# Patient Record
Sex: MALE | Race: WHITE | NOT HISPANIC OR LATINO | URBAN - METROPOLITAN AREA
[De-identification: names, ages, dates, MRNs, and addresses within clinical notes are randomized per-mention and may not be internally consistent; named-entity substitution may affect disease eponyms.]

---

## 2018-09-07 ENCOUNTER — OFFICE VISIT - HEALTHEAST (OUTPATIENT)
Dept: FAMILY MEDICINE | Facility: CLINIC | Age: 38
End: 2018-09-07

## 2018-10-26 ENCOUNTER — OFFICE VISIT - HEALTHEAST (OUTPATIENT)
Dept: FAMILY MEDICINE | Facility: CLINIC | Age: 38
End: 2018-10-26

## 2018-10-26 DIAGNOSIS — R26.89 BALANCE PROBLEM: ICD-10-CM

## 2018-10-26 DIAGNOSIS — Z23 NEED FOR VACCINATION: ICD-10-CM

## 2018-10-26 DIAGNOSIS — I10 ESSENTIAL HYPERTENSION, BENIGN: ICD-10-CM

## 2018-10-26 DIAGNOSIS — F43.22 ADJUSTMENT DISORDER WITH ANXIOUS MOOD: ICD-10-CM

## 2018-10-26 RX ORDER — HYDROXYZINE HYDROCHLORIDE 50 MG/1
50 TABLET, FILM COATED ORAL 3 TIMES DAILY PRN
Qty: 60 TABLET | Refills: 1 | Status: SHIPPED | OUTPATIENT
Start: 2018-10-26 | End: 2021-08-26

## 2018-11-21 ENCOUNTER — OFFICE VISIT - HEALTHEAST (OUTPATIENT)
Dept: FAMILY MEDICINE | Facility: CLINIC | Age: 38
End: 2018-11-21

## 2018-11-21 DIAGNOSIS — F43.22 ADJUSTMENT DISORDER WITH ANXIOUS MOOD: ICD-10-CM

## 2018-11-21 DIAGNOSIS — R26.89 BALANCE PROBLEM: ICD-10-CM

## 2019-01-02 ENCOUNTER — COMMUNICATION - HEALTHEAST (OUTPATIENT)
Dept: FAMILY MEDICINE | Facility: CLINIC | Age: 39
End: 2019-01-02

## 2019-01-02 DIAGNOSIS — F43.22 ADJUSTMENT DISORDER WITH ANXIOUS MOOD: ICD-10-CM

## 2019-01-29 ENCOUNTER — COMMUNICATION - HEALTHEAST (OUTPATIENT)
Dept: FAMILY MEDICINE | Facility: CLINIC | Age: 39
End: 2019-01-29

## 2019-01-29 DIAGNOSIS — F43.22 ADJUSTMENT DISORDER WITH ANXIOUS MOOD: ICD-10-CM

## 2019-01-29 RX ORDER — CITALOPRAM HYDROBROMIDE 10 MG/1
TABLET ORAL
Qty: 60 TABLET | Refills: 10 | Status: SHIPPED | OUTPATIENT
Start: 2019-01-29 | End: 2021-08-26

## 2020-09-28 ENCOUNTER — COMMUNICATION - HEALTHEAST (OUTPATIENT)
Dept: TELEHEALTH | Facility: CLINIC | Age: 40
End: 2020-09-28

## 2020-09-28 ENCOUNTER — OFFICE VISIT - HEALTHEAST (OUTPATIENT)
Dept: FAMILY MEDICINE | Facility: CLINIC | Age: 40
End: 2020-09-28

## 2020-09-28 DIAGNOSIS — Z00.00 ROUTINE GENERAL MEDICAL EXAMINATION AT A HEALTH CARE FACILITY: ICD-10-CM

## 2020-09-28 LAB
CHOLEST SERPL-MCNC: 188 MG/DL
FASTING STATUS PATIENT QL REPORTED: YES
FASTING STATUS PATIENT QL REPORTED: YES
GLUCOSE BLD-MCNC: 87 MG/DL (ref 70–125)
HDLC SERPL-MCNC: 43 MG/DL
LDLC SERPL CALC-MCNC: 135 MG/DL
TRIGL SERPL-MCNC: 51 MG/DL

## 2020-09-28 RX ORDER — CETIRIZINE HYDROCHLORIDE 5 MG/1
5 TABLET ORAL DAILY
Status: SHIPPED | COMMUNITY
Start: 2020-09-28 | End: 2023-12-14

## 2020-09-28 ASSESSMENT — MIFFLIN-ST. JEOR: SCORE: 2039.77

## 2020-09-28 ASSESSMENT — PATIENT HEALTH QUESTIONNAIRE - PHQ9
SUM OF ALL RESPONSES TO PHQ QUESTIONS 1-9: 2
SUM OF ALL RESPONSES TO PHQ QUESTIONS 1-9: 1

## 2020-10-01 ENCOUNTER — COMMUNICATION - HEALTHEAST (OUTPATIENT)
Dept: FAMILY MEDICINE | Facility: CLINIC | Age: 40
End: 2020-10-01

## 2021-05-27 ASSESSMENT — PATIENT HEALTH QUESTIONNAIRE - PHQ9: SUM OF ALL RESPONSES TO PHQ QUESTIONS 1-9: 1

## 2021-06-02 VITALS — BODY MASS INDEX: 26.85 KG/M2 | WEIGHT: 222 LBS

## 2021-06-02 VITALS — WEIGHT: 221 LBS | BODY MASS INDEX: 26.72 KG/M2

## 2021-06-02 VITALS — WEIGHT: 217 LBS | BODY MASS INDEX: 26.24 KG/M2

## 2021-06-05 VITALS
HEART RATE: 70 BPM | SYSTOLIC BLOOD PRESSURE: 122 MMHG | WEIGHT: 230 LBS | HEIGHT: 76 IN | OXYGEN SATURATION: 99 % | RESPIRATION RATE: 16 BRPM | DIASTOLIC BLOOD PRESSURE: 68 MMHG | BODY MASS INDEX: 28.01 KG/M2

## 2021-06-11 ENCOUNTER — AMBULATORY - HEALTHEAST (OUTPATIENT)
Dept: NURSING | Facility: CLINIC | Age: 41
End: 2021-06-11

## 2021-06-11 NOTE — PROGRESS NOTES
" Patient ID: Ricardo Richardson is a 40 y.o. male.  /68   Pulse 70   Resp 16   Ht 6' 4\" (1.93 m)   Wt (!) 230 lb (104.3 kg)   SpO2 99%   BMI 28.00 kg/m      Assessment/Plan:                Diagnoses and all orders for this visit:    Routine general medical examination at a health care facility  -     Lipid Cascade  -     Glucose    Other orders  -     Cancel: Influenza,Quad,High Dose,PF 65 YR+  -     Influenza, Seasonal Quad, PF =/> 6months    DISCUSSION  See discussion below.  Update influenza vaccine.  Laboratory testing as noted.  Subjective:     HPI    Ricardo Richardson is a generally healthy 40-year-old male.  In 2019 was treated briefly for symptoms consistent with adjustment disorder associated with stress within his family.  He was tapered off the medication is done well.  Reports no concerns with mental health.  Has had generally ideal cholesterol blood pressure and blood sugar measurements obtained in the past although it has been 7 years since his last evaluation.  He is generally active and healthy.  Discussed considerations related to returning to a gym facility for working out given the COVID-19 pandemic.  He has a  baby at home.  Everybody in his family is doing well.  He continues to work at [employer].  He does not smoke.  Discussed nutrition and exercise as a means to reduce weight.      Review of Systems  Complete review of systems is obtained.  Other than the specific considerations noted above complete review of systems is negative.          Objective:   Medications:  Current Outpatient Medications   Medication Sig     cetirizine (ZYRTEC) 5 MG tablet Take 5 mg by mouth daily.     citalopram (CELEXA) 10 MG tablet START 1 TABLET DAILY.CAN INCREASE BACK TO 2 TABLETS AS NEEDED FOR ANXIETY/DEPRESSION CONTROL.     hydrOXYzine HCl (ATARAX) 50 MG tablet Take 1 tablet (50 mg total) by mouth 3 (three) times a day as needed for anxiety (sleep).       Allergies:  No Known " "Allergies    Tobacco:   reports that he has quit smoking. He has quit using smokeless tobacco.    HEALTH PREVENTION    General  Dental care: Discussed the importance of regular dental care.  Eye care: Discussed importance of routine eye exams for glaucoma screening    Wt Readings from Last 3 Encounters:   09/28/20 (!) 230 lb (104.3 kg)   11/21/18 221 lb (100.2 kg)   10/26/18 217 lb (98.4 kg)     Body mass index is 28 kg/m .    The following high BMI interventions were performed this visit: encouragement to exercise    Cholesterol:   Y  LDL Calculated (mg/dL)   Date Value   09/30/2013 128   09/21/2011 117      Blood Pressure:   BP Readings from Last 3 Encounters:   09/28/20 122/68   11/21/18 120/70   10/26/18 140/62       Immunization History   Administered Date(s) Administered     Td, adult adsorbed, PF 10/26/2018     Tdap 06/07/2007     There are no preventive care reminders to display for this patient.     Physical Exam      /68   Pulse 70   Resp 16   Ht 6' 4\" (1.93 m)   Wt (!) 230 lb (104.3 kg)   SpO2 99%   BMI 28.00 kg/m      General Appearance:    Alert, cooperative, no distress   Eyes:   No scleral icterus or conjunctival irritation       Ears:    Normal TM's and external ear canals, both ears   Throat:   Lips, mucosa, and tongue normal; teeth and gums normal   Neck:   Supple, symmetrical, trachea midline, no adenopathy;        thyroid:  No enlargement/tenderness/nodules   Lungs:     Clear to auscultation bilaterally, respirations unlabored, no wheezes or crackles   Heart:    Regular rate and rhythm,  No murmur   Abdomen:    Soft, no distention, no tenderness on palpation, no masses, no organomegaly     Extremities:  No edema, no joint swelling or redness, no evidence of any injuries   Skin:  No concerning skin findings, no suspicious moles, no rashes   Neurologic:  On gross examination there is no motor or sensory deficit.  Patient walks with a normal gait                                   "

## 2021-06-20 NOTE — LETTER
Letter by Remy Dai MD at      Author: Remy Dai MD Service: -- Author Type: --    Filed:  Encounter Date: 10/1/2020 Status: (Other)         Ricardo Richardson  [address] [facility] [id number]             October 1, 2020         Dear Mr. Richardson,    Below are the results from your recent visit:    Resulted Orders   Lipid Cascade   Result Value Ref Range    Cholesterol 188 <=199 mg/dL    Triglycerides 51 <=149 mg/dL    HDL Cholesterol 43 >=40 mg/dL    LDL Calculated 135 (H) <=129 mg/dL    Patient Fasting > 8hrs? Yes    Glucose   Result Value Ref Range    Glucose 87 70 - 125 mg/dL    Patient Fasting > 8hrs? Yes     Narrative    Fasting Glucose reference range is 70-99 mg/dL per  American Diabetes Association (ADA) guidelines.       The blood sugar (glucose) is ideal there is no sign of diabetes.    The LDL cholesterol (bad cholesterol) is mildly elevated.  Continue to work hard to maintain a regular exercise schedule and a healthy balanced diet.  Recheck cholesterol again in 1 to 2 years.    Please call with questions or contact us using [employer].    Sincerely,        Electronically signed by Remy Dai MD

## 2021-06-20 NOTE — LETTER
Letter by Remy Dai MD at      Author: Remy Dai MD Service: -- Author Type: --    Filed:  Encounter Date: 9/28/2020 Status: (Other)         September 28, 2020     Patient: Ricardo Richardson   YOB: 1980   Date of Visit: 9/28/2020       To Whom it May Concern:    Ricardo Richardson was seen in my clinic on 9/28/2020.    If you have any questions or concerns, please don't hesitate to call.    Sincerely,         Electronically signed by Remy Dai MD

## 2021-06-21 NOTE — PROGRESS NOTES
Assessment/Plan:    1. Adjustment disorder with anxious mood  Given no significant history of anxiety or depression and presentation after grandfathers recent health diagnosis, will determine this as adjustment disorder with anxiety.  Discussed with patient that this may be a temporary condition for him but may be lifelong, it is difficult to say at this time.  Discussed options for management at this time including medication, therapy or both.  Patient is interested in medication therapy at this time.  He did notice improvement in symptoms with hydroxyzine, will continue this but discussed use during the day as needed for anxiety/panic symptoms as well as at night for sleep.  Also discussed starting SSRI medication of Celexa.  Discussed risks and benefits of these medications and patient wishes to start these at this time.  We will plan to follow-up in 4 weeks or sooner if needed; discussed that he may need further increase in Celexa dosing to adequately control symptoms.  - hydrOXYzine HCl (ATARAX) 50 MG tablet; Take 1 tablet (50 mg total) by mouth 3 (three) times a day as needed for anxiety (sleep).  Dispense: 60 tablet; Refill: 1  - citalopram (CELEXA) 20 MG tablet; Take 1/2 tablet (10 mg) daily for 1 week and then increase to 1 tablet (20 mg) daily.  Dispense: 30 tablet; Refill: 2    2. Benign Essential Hypertension  Patient with history of hypertension on his problem list, he reports after lifestyle changes this self resolved.  Today his blood pressure systolic is 140, and prior visits show borderline hypertension.  Given we are initiating multiple medications for his anxiety, will further discuss hypertension at his next visit -patient is comfortable with this plan.  If blood pressure continues to be high at next visit, would order BMP and lipid panel and consider initiation of antihypertensive such as lisinopril.    3. Balance problem  Very occasional issue for patient, no falls or tripping.  He thinks this  "may be related to recent viral illnesses.  He will continue to monitor for symptoms at this time and if persistent at next visit would consider ENT referral.    4. Need for vaccination  Due for tetanus vaccination, administered today  - Td, Preservative Free (green label)      Follow up: 4 weeks for mood/med recheck; sooner if needed    Jasmin Burch MD  [facility]    Subjective:    Patient ID: Ricardo Richardson is a 38 y.o. male is here today for evaluation of anxiety    Anxiety   -Patient was seen at [facility] on 10/17/18 for anxiety symptoms following grandfathers diagnosis of myelodysplastic syndrome and his subsequent development of cold symptoms, he was prescribed hydroxyzine as needed anxiety and recommended follow-up in primary care clinic  -pt states \"things haven't been going well\" since the ER visit  -symptoms started around 10/15  -took hydroxyzine twice and it was helpful; was told the medication was for sleep so he had only taken it twice before bed  -anxiety related to concerns for health of self - viral illness has resolved now since 10/17  -no hx anxiety issues or panic attacks; thinks had some issues with depression during childhood (young childhood/preteen, thinks depression was from family stress with parents divorce and remarriages, saw counselor individually for a while and family therapy)  -panic attacks lately - anxious/nervous, tight chest, lightheaded, difficult to focus; starting to taper off a little he thinks, seem worse in the mornings  -support system: wife (also has anxiety, well controlled, on medication), sister, few good friends (felt better after talking with a good friend about his issues)  -PHQ 9 score 9 today  -GAD7 score 16 today, somewhat difficult to function    HTN  -Blood pressure 140/70 today, 140/62 on repeat  -Patient has had prior elevated blood pressures within our system including, 156/83 on 9/11/18 and 147/72 on 9/7/18  -Additionally blood pressure " was 143/89 at recent ER visit, though this was during acute anxiety/panic attack  -Patient reports being diagnosed with hypertension many years ago, but made lifestyle changes and blood pressure normalized; he was never started on antihypertensive medication  -He does acknowledge that lately his blood pressures have been higher again  -Apart from his panic attacks he has not had additional chest pain or shortness of breath    Occasional balance issue  -Patient reports having a cold 7-8 weeks ago where both of his ears felt plugged  -The illness has since resolved, but he continues to feel very occasional disequilibrium or off balance  -He has had no trips or falls  -Symptom does not occur daily  -No issues with hearing or pressure in the ears  -He did have another cold last week that has since resolved      Patient Active Problem List   Diagnosis     Skin Neoplasm Of Uncertain Behavior     Benign Essential Hypertension     Past Medical History:   Diagnosis Date     Benign Essential Hypertension     Created by Conversion      Skin Neoplasm Of Uncertain Behavior     Mole removed, benign Advised to have regular routine skin checks      Past Surgical History:   Procedure Laterality Date     MOLE REMOVAL       TONSILLECTOMY AND ADENOIDECTOMY      as a child     No current outpatient prescriptions on file prior to visit.     No current facility-administered medications on file prior to visit.      No Known Allergies    Social History     Social History     Marital status:      Spouse name: N/A     Number of children: N/A     Years of education: N/A     Occupational History     Not on file.     Social History Main Topics     Smoking status: Former Smoker     Smokeless tobacco: Former User      Comment: off/on, quit 2016     Alcohol use Yes      Comment: 2-3x/week     Drug use: No     Sexual activity: Not on file     Other Topics Concern     Not on file     Social History Narrative     Review of systems is as stated  in HPI, and the remainder of system review is otherwise negative.    Objective:      /62  Pulse 92  Wt 217 lb (98.4 kg)  BMI 26.24 kg/m2    General appearance: awake, NAD  HEENT: atraumatic, normocephalic, no scleral icterus or injection, bilateral TMs normal without erythema or effusion, moist mucous membranes  CV: RRR, no murmurs/rubs/gallops, normal S1 and S2  Neuro: alert, oriented x3, CNs grossly intact, no focal deficits appreciated, normal gait  Psych: appears slightly anxious, affect congruent with mood, normal behaviors, normal speech, appropriately dressed, no evidence of hallucinations or delusions

## 2021-06-21 NOTE — PROGRESS NOTES
Assessment/Plan:    1. Adjustment disorder with anxious mood  Patient is doing well since last visit, feels that symptoms have dramatically improved.  He asks about getting off the medication.  We discussed recommendation to continue med for 3-6 months and then reevaluate, but patient feels strongly about trialing taper off at this time.  We discussed that he could decrease dose to 10 mg daily, if he is feeling well after multiple weeks on this dose he could discontinue.  However, if mood symptoms worsen with decreasing dose, would recommend increasing back to 20 mg daily.  Patient will call me in 2-4 weeks to update on mood status and Celexa dosage.  - citalopram (CELEXA) 10 MG tablet; Start 1 tablet (10 mg) daily. Can increase back to 2 tablets (20 mg) as needed for anxiety/depression control..  Dispense: 30 tablet; Refill: 1    2. Balance problem  Improving since last visit, will continue to monitor    Follow up: Patient will call in 2-4 weeks to update on mood    Jasmin Burch MD  [facility]    Subjective:    Patient ID: Ricardo Richardson is a 38 y.o. male is here today for follow up depression/anxiety    Depression/anxiety  -doing well since last visit - less extreme highs and lows, feels more stable  -taking celexa 20 mg daily (started with 10 mg x1 week)  -no side effects reported; had some GI stuff initially but this resolved  -occasional use of hydroxyzine for anxiety, is quartering them so 12.5 mg at a time - hasn't needed for the past week  -PHQ9 score 3 today; olivia 7 score 3 today-both much improved from prior visit  -Patient expresses wonder if the medication or simply time has allowed his mood to improve  -He asks today if it would be reasonable for him to taper off the medication  -No thoughts of self-harm    Patient Active Problem List   Diagnosis     Skin Neoplasm Of Uncertain Behavior     Benign Essential Hypertension     Past Medical History:   Diagnosis Date     Benign Essential  Hypertension     Created by Conversion      Skin Neoplasm Of Uncertain Behavior     Mole removed, benign Advised to have regular routine skin checks      Past Surgical History:   Procedure Laterality Date     MOLE REMOVAL       TONSILLECTOMY AND ADENOIDECTOMY      as a child     Current Outpatient Medications on File Prior to Visit   Medication Sig Dispense Refill     hydrOXYzine HCl (ATARAX) 50 MG tablet Take 1 tablet (50 mg total) by mouth 3 (three) times a day as needed for anxiety (sleep). 60 tablet 1     [DISCONTINUED] citalopram (CELEXA) 20 MG tablet Take 1/2 tablet (10 mg) daily for 1 week and then increase to 1 tablet (20 mg) daily. 30 tablet 2     No current facility-administered medications on file prior to visit.      No Known Allergies     Social History     Socioeconomic History     Marital status:      Spouse name: Not on file     Number of children: Not on file     Years of education: Not on file     Highest education level: Not on file   Social Needs     Financial resource strain: Not on file     Food insecurity - worry: Not on file     Food insecurity - inability: Not on file     Transportation needs - medical: Not on file     Transportation needs - non-medical: Not on file   Occupational History     Not on file   Tobacco Use     Smoking status: Former Smoker     Smokeless tobacco: Former User     Tobacco comment: off/on, quit 2016   Substance and Sexual Activity     Alcohol use: Yes     Comment: 2-3x/week     Drug use: No     Sexual activity: Not on file   Other Topics Concern     Not on file   Social History Narrative     Not on file     Review of systems is as stated in HPI, and the remainder of system review is otherwise negative.    Objective:      /70   Wt 221 lb (100.2 kg)   BMI 26.72 kg/m      General appearance: awake, NAD  HEENT: atraumatic, normocephalic, no scleral icterus or injection  Neuro: alert, oriented x3, CNs grossly intact, no focal deficits appreciated  Psych:  normal mood/affect/behavior, answering questions appropriately, linear thought process

## 2021-06-22 NOTE — TELEPHONE ENCOUNTER
Medication Question or Clarification  Who is calling: Patient  What medication are you calling about?:   citalopram (CELEXA) 10 MG tablet 30 tablet 1 11/21/2018     Sig: Start 1 tablet (10 mg) daily. Can increase back to 2 tablets (20 mg) as needed for anxiety/depression control..        What dose do you take?: 15 mg  How often are you taking the medication?: daily  Who prescribed the medication?: Dr Burch  What is your question/concern?: Patient would like to try two more months to try to decrease dose of this medication. Patient states did take 10 mg for part of December, but did not feel quite right, so went up to 15 mg and states feels ok on this dose so far. Will try to decrease again and let provider know how he is doing.  Please send 2 moths of 10 mg to pharmacy per patient request.  Pharmacy: [facility] in [facility].  Okay to leave a detailed message?: Yes  Site CMT - Please call the pharmacy to obtain any additional needed information.

## 2021-06-23 NOTE — TELEPHONE ENCOUNTER
Refill Approved    Rx renewed per Medication Renewal Policy. Medication was last renewed on 1/2/19.    Mouna Albrecht, [facility] Triage/Med Refill 1/29/2019     Requested Prescriptions   Pending Prescriptions Disp Refills     citalopram (CELEXA) 10 MG tablet [Pharmacy Med Name: CITALOPRAM HBR 10 MG TABLET] 60 tablet 0     Sig: START 1 TABLET DAILY.CAN INCREASE BACK TO 2 TABLETS AS NEEDED FOR ANXIETY/DEPRESSION CONTROL.    SSRI Refill Protocol  Passed - 1/29/2019  1:40 AM       Passed - PCP or prescribing provider visit in last year    Last office visit with prescriber/PCP: 11/21/2018 Jasmin Burch MD OR same dept: 11/21/2018 Jasmin Burch MD OR same specialty: 11/21/2018 Jasmin Burch MD  Last physical: Visit date not found Last MTM visit: Visit date not found   Next visit within 3 mo: Visit date not found  Next physical within 3 mo: Visit date not found  Prescriber OR PCP: Jasmin Burch MD  Last diagnosis associated with med order: 1. Adjustment disorder with anxious mood  - citalopram (CELEXA) 10 MG tablet [Pharmacy Med Name: CITALOPRAM HBR 10 MG TABLET]; START 1 TABLET DAILY.CAN INCREASE BACK TO 2 TABLETS AS NEEDED FOR ANXIETY/DEPRESSION CONTROL.  Dispense: 60 tablet; Refill: 0    If protocol passes may refill for 12 months if within 3 months of last provider visit (or a total of 15 months).

## 2021-06-26 NOTE — PROGRESS NOTES
Progress Notes by Arun Lantigua PA-C at 9/7/2018  1:50 PM     Author: Arun Lantigua PA-C Service: -- Author Type: Physician Assistant    Filed: 9/7/2018  8:50 PM Encounter Date: 9/7/2018 Status: Signed    : Arun Lantigua PA-C (Physician Assistant)          Assessment and Plan     Ricardo was seen today for insect bite.    Diagnoses and all orders for this visit:    Hymenoptera sting, undetermined intent, initial encounter  -     predniSONE (DELTASONE) 20 MG tablet; Take 1 tablet (20 mg total) by mouth daily for 5 days.       HPI     Chief Complaint   Patient presents with   ? Insect Bite     Poss bee sting both leg        Ricardo Richardson is a 38 y.o. male seen today for swelling from bee stings that occurred 5 days ago.    Stung multiple times on the lower legs by bees or wasps.  There was swelling of his lower legs and ankles within the first couple hours.  Over the subsequent 5 days, swelling has not really decreased and he thinks it may have even increased slightly this morning.  No significant pain.  Mild itching.  No itching, swelling anywhere else in his body.  Denies globus or intraoral swelling.       Current Outpatient Prescriptions:   ?  predniSONE (DELTASONE) 20 MG tablet, Take 1 tablet (20 mg total) by mouth daily for 5 days., Disp: 5 tablet, Rfl: 0     Reviewed and updated: medical history, medications and allergies.     Review of Systems     General: Denies fever, chills, fatigue.  Cardiovascular: Denies chest pain, dyspnea on exertion, palpitations.  Respiratory: Denies dyspnea, cough, wheezing.  GI: Denies nausea, vomiting, diarrhea, constipation.     Objective     Vitals:    09/07/18 1402 09/07/18 1407   BP: 156/83 147/72   Patient Site: Right Arm Right Arm   Patient Position: Sitting Sitting   Cuff Size: Adult Large Adult Large   Pulse: 82 82   Resp: 18    Temp: 98.2  F (36.8  C)    TempSrc: Oral    SpO2: 100%    Weight: 222 lb (100.7 kg)         Reviewed vital  signs.  General: Appears calm, comfortable. Answers questions quickly and appropriately with clear speech. No apparent distress.  Skin: Pink, warm, dry.  Multiple areas of swelling with mild erythema on both ankles.  The swollen areas are not indurated or tender. The erythema is not clearly demarcated  HENT: Normocephalic, atraumatic.  No perioral or intraoral swelling.  Neck: Supple.  Cardiovascular: Strong, regular radial pulse.  Respiratory: Normal respiratory effort.  Neuro: Memory and cognition appear normal. Normal gait.  Psych: Mood and affect appear normal.     Imaging:   No results found.    Labs:  No results found for this or any previous visit (from the past 24 hour(s)).     Medical Decision-Making     Ricardo is generally well-appearing 38-year-old male presents with multiple areas of swelling on his lower legs and ankles from presumed hymenoptera stings.  He is here today because the swelling has not significantly decreased and may have actually increased slightly this morning.  His appearance is nontoxic.  He is not tachycardic, tachypneic, or febrile.  There is no evidence of systemic reaction.  Exam is consistent with multiple local reactions to hymenoptera sting.  As the swelling has not been decreasing, prescribed 5 days of prednisone 40 mg.  Also discussed symptomatic management of the pruritus.    Reviewed red flags that would trigger a prompt return to the clinic as noted below under patient instructions.  He expressed understanding of these directions and is in agreement with the plan.     Patient Instructions     Patient Instructions     Local Reaction to an Insect Sting   You have been stung or bitten by an insect. The insects venom or body fluid is causing your skin to react in the area where you were stung or bitten. This often causes redness, itching and swelling. This reaction will fade over a few hours, but it can last a few days. An insect bite or sting can become infected 1 to 3 days  later, so watch for the signs below. Sometimes it is hard to tell the difference between a local reaction to the insect bite or sting and an early infection, so you may be given antibiotics.  Common insect stings causing problems are from wasps, bees, yellow jackets, and hornets. Common bites are from spiders, mosquitoes, fleas, or ticks. Other types of insects may be more common in different parts of the country or world.  Most people think of allergic reactions when someone has a rash or itchy skin. Symptoms can include:    Rash, hives, redness, welts, or blisters    Itching, burning, stinging, or pain    Swelling around the sting area.  Sometimes swelling spreads to other areas.  Home care  Medicines  The healthcare provider may prescribe medicines to relieve swelling, itching, and pain. Follow the providers instructions when taking these medicines.    If you had a severe reaction, the provider may prescribe an epinephrine kit. Epinephrine will stop an allergic reaction from getting worse. Before you leave the hospital, be sure that you understand when and how to use this medicine.    Diphenhydramine is an oral antihistamine available at drugstores and groceries. Unless a prescription antihistamine was given, you can use this medicine to reduce itching if large areas of the skin are involved. The medicine may make you sleepy, so be careful using it in the daytime or when going to school, working, or driving. Dont use diphenhydramine if you have glaucoma or if you are a man with trouble urinating because of an enlarged prostate. Other antihistamines cause less drowsiness and are good choices for daytime use. Ask your pharmacist for suggestions.    Dont use diphenhydramine cream on your skin. In some people it can cause additional reaction and make you allergic to this medicine.    Calamine lotion or oatmeal baths sometimes help with itching.    You may use acetaminophen or ibuprofen to control pain, unless another  pain medicine was prescribed. Talk with your healthcare provider before using these medicines if you have chronic liver or kidney disease. Also talk with your provider if youve had a stomach ulcer or GI bleeding.  General care      If itching is a problem, dont take hot showers or baths. Stay out of direct sunlight. These heat up your skin and will make the itching worse.    Use an ice pack to reduce local areas of redness and itching. You can make your own ice pack by putting ice cubes in a bag that seals and wrapping it in a thin towel. Dont put the ice directly on your skin, because it can damage the skin.    Try not to scratch any affected areas and damage the skin. This will help prevent an infection.    If oral antibiotics were prescribed, be sure to take them until finished.  Preventing future reactions    Future reactions could be worse than this one, so try to stay away from places where you might be stung again.    Be aware that honeybees nest in trees. Wasps and yellow jackets nest in the ground, trees, or roof eaves.    If you are stung by a honeybee, a stinger will remain in your skin. Wasps, yellow jackets, and hornets dont leave a stinger behind. Move away from the nest area right away. The stinger of a honeybee releases a substance that will attract other bees to you. Once you are away from the nest, then remove the stinger as quickly as possible.    After any sting, you may apply ice and take diphenhydramine or another antihistamine. If you develop any of the warning signs below, seek help right away.    If you are at high risk for another sting, or if your reaction included dizziness, fainting, or trouble breathing or swallowing, ask your doctor for an insect allergy kit.    Remove any ticks on the skin with a set of fine tweezers.  the tick as close to the skin as possible. Pull back gently but firmly. Use an even, steady pressure. Dont jerk or twist. Dont squeeze, crush, or puncture the body  of the tick. The bodily fluids may contain infection-causing germs. Dont use a smoldering match or cigarette, nail polish, petroleum jelly, liquid soap, or kerosene. These may irritate the tick. If any mouthparts of the tick remain in the skin, these should be left alone. They will fall off on their own. Trying to remove these parts may damage the skin unless they can be removed very easily. After the tick is removed, wash the bite area with rubbing alcohol, iodine, or soap and water.  Follow-up care  Follow up with your doctor in 2 days, or as advised, if your symptoms dont start to get better.  Call 911  Call 911 if any of these occur:    Trouble breathing or swallowing, or wheezing    New or worsening swelling in the mouth, throat, or tongue    Hoarse voice or trouble speaking    Confused    Very drowsy or trouble awakening    Fainting or loss of consciousness    Rapid heart rate    Low blood pressure    Feeling of doom    Nausea, vomiting, abdominal pain, or diarrhea    Vomiting blood, or large amounts of blood in stool    Seizure  When to seek medical advice  Call your healthcare provider right away if any of these occur:    Spreading areas of itching, redness or swelling    New or worse swelling in the face, eyelids, or  lips    Dizziness or weakness  Also call your provider right away if you have signs of infection:    Spreading redness    Increased pain or swelling    Fever of 100.4 F (38 C) or higher, or as directed by your healthcare provider    Colored fluid draining from the sting area   Date Last Reviewed: 10/1/2016    4863-8125 [employer]. [address] [place] [address], [place] [address]. All rights reserved. This information is not intended as a substitute for professional medical care. Always follow your healthcare professional's instructions.            Discussed benefit vs risk of medications, dosing, side effects.  Patient was able to verbalize understanding.  After visit summary was  provided for patient.     David Lantigua PA-C

## 2021-07-08 ENCOUNTER — AMBULATORY - HEALTHEAST (OUTPATIENT)
Dept: NURSING | Facility: CLINIC | Age: 41
End: 2021-07-08

## 2021-08-26 ENCOUNTER — OFFICE VISIT (OUTPATIENT)
Dept: FAMILY MEDICINE | Facility: CLINIC | Age: 41
End: 2021-08-26
Payer: COMMERCIAL

## 2021-08-26 VITALS
DIASTOLIC BLOOD PRESSURE: 78 MMHG | HEART RATE: 94 BPM | BODY MASS INDEX: 26.8 KG/M2 | SYSTOLIC BLOOD PRESSURE: 128 MMHG | HEIGHT: 77 IN | WEIGHT: 227 LBS | OXYGEN SATURATION: 98 %

## 2021-08-26 DIAGNOSIS — Z00.00 ROUTINE HEALTH MAINTENANCE: ICD-10-CM

## 2021-08-26 DIAGNOSIS — Z11.4 SCREENING FOR HIV (HUMAN IMMUNODEFICIENCY VIRUS): ICD-10-CM

## 2021-08-26 DIAGNOSIS — Z11.59 NEED FOR HEPATITIS C SCREENING TEST: ICD-10-CM

## 2021-08-26 DIAGNOSIS — I10 ESSENTIAL HYPERTENSION, BENIGN: Primary | ICD-10-CM

## 2021-08-26 LAB
ALBUMIN SERPL-MCNC: 4.2 G/DL (ref 3.5–5)
ALP SERPL-CCNC: 78 U/L (ref 45–120)
ALT SERPL W P-5'-P-CCNC: 34 U/L (ref 0–45)
ANION GAP SERPL CALCULATED.3IONS-SCNC: 11 MMOL/L (ref 5–18)
AST SERPL W P-5'-P-CCNC: 19 U/L (ref 0–40)
BILIRUB SERPL-MCNC: 0.5 MG/DL (ref 0–1)
BUN SERPL-MCNC: 19 MG/DL (ref 8–22)
CALCIUM SERPL-MCNC: 9.3 MG/DL (ref 8.5–10.5)
CHLORIDE BLD-SCNC: 108 MMOL/L (ref 98–107)
CHOLEST SERPL-MCNC: 179 MG/DL
CO2 SERPL-SCNC: 24 MMOL/L (ref 22–31)
CREAT SERPL-MCNC: 0.81 MG/DL (ref 0.7–1.3)
FASTING STATUS PATIENT QL REPORTED: YES
GFR SERPL CREATININE-BSD FRML MDRD: >90 ML/MIN/1.73M2
GLUCOSE BLD-MCNC: 94 MG/DL (ref 70–125)
HDLC SERPL-MCNC: 39 MG/DL
HIV 1+2 AB+HIV1 P24 AG SERPL QL IA: NEGATIVE
HOLD SPECIMEN: NORMAL
LDLC SERPL CALC-MCNC: 107 MG/DL
POTASSIUM BLD-SCNC: 4.1 MMOL/L (ref 3.5–5)
PROT SERPL-MCNC: 6.6 G/DL (ref 6–8)
SODIUM SERPL-SCNC: 143 MMOL/L (ref 136–145)
TRIGL SERPL-MCNC: 165 MG/DL

## 2021-08-26 PROCEDURE — 87389 HIV-1 AG W/HIV-1&-2 AB AG IA: CPT | Performed by: FAMILY MEDICINE

## 2021-08-26 PROCEDURE — 86803 HEPATITIS C AB TEST: CPT | Performed by: FAMILY MEDICINE

## 2021-08-26 PROCEDURE — 80061 LIPID PANEL: CPT | Performed by: FAMILY MEDICINE

## 2021-08-26 PROCEDURE — 80053 COMPREHEN METABOLIC PANEL: CPT | Performed by: FAMILY MEDICINE

## 2021-08-26 PROCEDURE — 36415 COLL VENOUS BLD VENIPUNCTURE: CPT | Performed by: FAMILY MEDICINE

## 2021-08-26 PROCEDURE — 99396 PREV VISIT EST AGE 40-64: CPT | Performed by: FAMILY MEDICINE

## 2021-08-26 ASSESSMENT — MIFFLIN-ST. JEOR: SCORE: 2034.11

## 2021-08-26 NOTE — LETTER
August 30, 2021      Ricardo Richardson  [address] [place] [address]        Dear ,    We are writing to inform you of your test results.    Everything looks pretty good. Your triglycerides are very slightly elevated and your HDL (good cholesterol) is slightly low. Nothing I would worry about.     Resulted Orders   HIV Antigen Antibody Combo   Result Value Ref Range    HIV Antigen Antibody Combo Negative Negative   Hepatitis C Screen Reflex to HCV RNA Quant and Genotype   Result Value Ref Range    Hepatitis C Antibody Nonreactive Nonreactive    Narrative    Assay performance characteristics have not been established for newborns, infants, and children.   Comprehensive metabolic panel (BMP + Alb, Alk Phos, ALT, AST, Total. Bili, TP)   Result Value Ref Range    Sodium 143 136 - 145 mmol/L    Potassium 4.1 3.5 - 5.0 mmol/L    Chloride 108 (H) 98 - 107 mmol/L    Carbon Dioxide (CO2) 24 22 - 31 mmol/L    Anion Gap 11 5 - 18 mmol/L    Urea Nitrogen 19 8 - 22 mg/dL    Creatinine 0.81 0.70 - 1.30 mg/dL    Calcium 9.3 8.5 - 10.5 mg/dL    Glucose 94 70 - 125 mg/dL    Alkaline Phosphatase 78 45 - 120 U/L    AST 19 0 - 40 U/L    ALT 34 0 - 45 U/L    Protein Total 6.6 6.0 - 8.0 g/dL    Albumin 4.2 3.5 - 5.0 g/dL    Bilirubin Total 0.5 0.0 - 1.0 mg/dL    GFR Estimate >90 >60 mL/min/1.73m2      Comment:      As of July 11, 2021, eGFR is calculated by the CKD-EPI creatinine equation, without race adjustment. eGFR can be influenced by muscle mass, exercise, and diet. The reported eGFR is an estimation only and is only applicable if the renal function is stable.   Lipid panel reflex to direct LDL Non-fasting   Result Value Ref Range    Cholesterol 179 <=199 mg/dL    Triglycerides 165 (H) <=149 mg/dL    Direct Measure HDL 39 (L) >=40 mg/dL      Comment:      HDL Cholesterol Reference Range:     0-2 years:   No reference ranges established for patients under 2 years old  [email or web address]  [facility].    2-8 years:  Greater than 45 mg/dL     18 years and older:   Female: Greater than or equal to 50 mg/dL   Male:   Greater than or equal to 40 mg/dL    LDL Cholesterol Calculated 107 <=129 mg/dL    Patient Fasting > 8hrs? Yes    Extra Purple Top Tube   Result Value Ref Range    Hold Specimen JIC        If you have any questions or concerns, please call the clinic at the number listed above.       Sincerely,      Karina Li MD

## 2021-08-26 NOTE — PROGRESS NOTES
"  Assessment/Plan:     Patient presents today for routine physical examination.    Healthcare Maintenance: USPSTF recommendations for age 41;  Patient has been counseled on/screened for:  - intimate partner violence and there are no concerns at this time  - a healthful diet and physical activity for CVD prevention  - Diabetes and hyperlipidemia: screening was performed.   Sexually transmitted infections: Patient would not like to be screened for chlamydia, gonorrhea, syphilis, HIV, and hepatitis  Immunizations: up to date       Additional concerns are as detailed below:    1. Benign Essential Hypertension  Well-controlled without medication, will remove from patient's problem list and insert into patient's history.  - Comprehensive metabolic panel (BMP + Alb, Alk Phos, ALT, AST, Total. Bili, TP); Future    2. Screening for HIV (human immunodeficiency virus)  - HIV Antigen Antibody Combo; Future    3. Need for hepatitis C screening test  - Hepatitis C Screen Reflex to HCV RNA Quant and Genotype; Future    4. Routine health maintenance  - Lipid panel reflex to direct LDL Non-fasting; Future      AVS printed and given to patient.  Return to clinic in 1 year.     I have had an Advance Directives discussion with the patient.    This note has been dictated using voice recognition software. Any grammatical or context distortions are unintentional and inherent to the the software.     Karina Li MD  Family Medicine [facility]    Subjective:     Ricardo Richardson is a 41 year old male who presents to clinic for routine physical.    Additional concerns include:    1. none    PHQ-2 Score:  0    Health Care Directive: discussed    Patient Care Team:   PCP: Remy Dai    Past Medical History, Family History, and Social History reviewed.     Review of systems:  Patient endorses: none  The remainder of the 10 system review is otherwise negative.    Objective:     /78   Pulse 94   Ht 1.943 m (6' 4.5\")   Wt " 103 kg (227 lb)   SpO2 98%   BMI 27.27 kg/m    Gen: Alert, NAD, appears stated age, normal hygiene   Eyes: conjunctivae without injection, sclera clear, EOMI  ENT/mouth: nares clear, septum midline, absent rhinorrhea,absent pharyngeal injection, neck is supple, no thyroid enlargement  CV: RRR, no murmur appreciated, pedal edema absent bilaterally  Resp: CTAB, no wheezes, rales or ronchi  ABD: normoactive, non-tender to palpation, nondistended  MSK: grossly full range of motion in all joints, no obvious deformity  Neuro: CN II-XII grossly intact, no deficits in coordination  Psych: no apparent hallucinations or delusions, no pressured speech; alert, oriented x3  SKIN: dry and without lesions  Heme/lymph: no pallor, no active bleeding/bruising, no adenopathy appreciated    Medications:  Current Outpatient Medications   Medication     cetirizine (ZYRTEC) 5 MG tablet     citalopram (CELEXA) 10 MG tablet     hydrOXYzine HCl (ATARAX) 50 MG tablet     No current facility-administered medications for this visit.       Allergies:  No Known Allergies    PMH:  Past Medical History:   Diagnosis Date     Essential hypertension, benign     Created by Conversion      Neoplasm of uncertain behavior of skin     Mole removed, benign Advised to have regular routine skin checks        PSH:  Past Surgical History:   Procedure Laterality Date     MOLE REMOVAL       TONSILLECTOMY & ADENOIDECTOMY      as a child       Family Hx:  Family History   Problem Relation Age of Onset     Diabetes Mother      Diabetes Father      No Known Problems Sister      Dementia Maternal Grandmother      Myelodysplastic syndrome Maternal Grandfather      Pacemaker Maternal Grandfather      Heart Disease Paternal Grandmother      Heart Disease Paternal Grandfather        Social History:  Social History     Socioeconomic History     Marital status:      Spouse name: Not on file     Number of children: Not on file     Years of education: Not on file      Highest education level: Not on file   Occupational History     Not on file   Tobacco Use     Smoking status: Former Smoker     Smokeless tobacco: Former User     Tobacco comment: off/on, quit 2016   Substance and Sexual Activity     Alcohol use: Yes     Comment: Alcoholic Drinks/day: 2-3x/week     Drug use: No     Sexual activity: Not on file   Other Topics Concern     Not on file   Social History Narrative     Not on file     Social Determinants of Health     Financial Resource Strain:      Difficulty of Paying Living Expenses:    Food Insecurity:      Worried About Running Out of Food in the Last Year:      Ran Out of Food in the Last Year:    Transportation Needs:      Lack of Transportation (Medical):      Lack of Transportation (Non-Medical):    Physical Activity:      Days of Exercise per Week:      Minutes of Exercise per Session:    Stress:      Feeling of Stress :    Social Connections:      Frequency of Communication with Friends and Family:      Frequency of Social Gatherings with Friends and Family:      Attends [employer] Services:      Active Member of Clubs or Organizations:      Attends Club or Organization Meetings:      Marital Status:    Intimate Partner Violence:      Fear of Current or Ex-Partner:      Emotionally Abused:      Physically Abused:      Sexually Abused:        No components found for: LDLCALC     Immunization History   Administered Date(s) Administered     COVID-19,PF,Pfizer 06/11/2021, 07/08/2021     Influenza Vaccine IM > 6 months Valent IIV4 11/08/2019, 09/28/2020     TD (ADULT, 7+) 10/26/2018     Tdap (Adacel,Boostrix) 06/07/2007         Answers for HPI/ROS submitted by the patient on 8/26/2021  Frequency of exercise:: None  Getting at least 3 servings of Calcium per day:: Yes  Diet:: Regular (no restrictions)  Taking medications regularly:: Not Applicable  Medication side effects:: Not applicable  Bi-annual eye exam:: NO  Dental care twice a year:: NO  Sleep apnea or symptoms  of sleep apnea:: None  Additional concerns today:: No

## 2021-08-27 LAB — HCV AB SERPL QL IA: NONREACTIVE

## 2022-09-27 ENCOUNTER — OFFICE VISIT (OUTPATIENT)
Dept: FAMILY MEDICINE | Facility: CLINIC | Age: 42
End: 2022-09-27
Payer: COMMERCIAL

## 2022-09-27 VITALS
DIASTOLIC BLOOD PRESSURE: 78 MMHG | OXYGEN SATURATION: 98 % | BODY MASS INDEX: 27.31 KG/M2 | SYSTOLIC BLOOD PRESSURE: 130 MMHG | WEIGHT: 231.3 LBS | HEIGHT: 77 IN | HEART RATE: 80 BPM

## 2022-09-27 DIAGNOSIS — Z00.00 ANNUAL PHYSICAL EXAM: Primary | ICD-10-CM

## 2022-09-27 DIAGNOSIS — Z13.220 SCREENING FOR HYPERLIPIDEMIA: ICD-10-CM

## 2022-09-27 LAB
ALBUMIN SERPL BCG-MCNC: 4.6 G/DL (ref 3.5–5.2)
ALP SERPL-CCNC: 65 U/L (ref 40–129)
ALT SERPL W P-5'-P-CCNC: 24 U/L (ref 10–50)
ANION GAP SERPL CALCULATED.3IONS-SCNC: 8 MMOL/L (ref 7–15)
AST SERPL W P-5'-P-CCNC: 21 U/L (ref 10–50)
BILIRUB SERPL-MCNC: 0.3 MG/DL
BUN SERPL-MCNC: 16.9 MG/DL (ref 6–20)
CALCIUM SERPL-MCNC: 9 MG/DL (ref 8.6–10)
CHLORIDE SERPL-SCNC: 105 MMOL/L (ref 98–107)
CHOLEST SERPL-MCNC: 182 MG/DL
CREAT SERPL-MCNC: 0.81 MG/DL (ref 0.67–1.17)
DEPRECATED HCO3 PLAS-SCNC: 27 MMOL/L (ref 22–29)
ERYTHROCYTE [DISTWIDTH] IN BLOOD BY AUTOMATED COUNT: 13 % (ref 10–15)
GFR SERPL CREATININE-BSD FRML MDRD: >90 ML/MIN/1.73M2
GLUCOSE SERPL-MCNC: 94 MG/DL (ref 70–99)
HCT VFR BLD AUTO: 44.8 % (ref 40–53)
HDLC SERPL-MCNC: 39 MG/DL
HGB BLD-MCNC: 15 G/DL (ref 13.3–17.7)
LDLC SERPL CALC-MCNC: 121 MG/DL
MCH RBC QN AUTO: 29.1 PG (ref 26.5–33)
MCHC RBC AUTO-ENTMCNC: 33.5 G/DL (ref 31.5–36.5)
MCV RBC AUTO: 87 FL (ref 78–100)
NONHDLC SERPL-MCNC: 143 MG/DL
PLATELET # BLD AUTO: 223 10E3/UL (ref 150–450)
POTASSIUM SERPL-SCNC: 4.6 MMOL/L (ref 3.4–5.3)
PROT SERPL-MCNC: 6.5 G/DL (ref 6.4–8.3)
RBC # BLD AUTO: 5.15 10E6/UL (ref 4.4–5.9)
SODIUM SERPL-SCNC: 140 MMOL/L (ref 136–145)
TRIGL SERPL-MCNC: 112 MG/DL
WBC # BLD AUTO: 4.5 10E3/UL (ref 4–11)

## 2022-09-27 PROCEDURE — 99396 PREV VISIT EST AGE 40-64: CPT | Performed by: PHYSICIAN ASSISTANT

## 2022-09-27 PROCEDURE — 80061 LIPID PANEL: CPT | Performed by: PHYSICIAN ASSISTANT

## 2022-09-27 PROCEDURE — 36415 COLL VENOUS BLD VENIPUNCTURE: CPT | Performed by: PHYSICIAN ASSISTANT

## 2022-09-27 PROCEDURE — 85027 COMPLETE CBC AUTOMATED: CPT | Performed by: PHYSICIAN ASSISTANT

## 2022-09-27 PROCEDURE — 80053 COMPREHEN METABOLIC PANEL: CPT | Performed by: PHYSICIAN ASSISTANT

## 2022-09-27 ASSESSMENT — ENCOUNTER SYMPTOMS
HEARTBURN: 0
WEAKNESS: 0
PARESTHESIAS: 0
DIZZINESS: 0
NAUSEA: 0
HEMATOCHEZIA: 0
MYALGIAS: 0
FREQUENCY: 0
HEMATURIA: 0
ARTHRALGIAS: 0
COUGH: 0
FEVER: 0
HEADACHES: 0
PALPITATIONS: 0
SORE THROAT: 0
CHILLS: 0
ABDOMINAL PAIN: 0
DIARRHEA: 0
CONSTIPATION: 0
EYE PAIN: 0
NERVOUS/ANXIOUS: 0
JOINT SWELLING: 0
SHORTNESS OF BREATH: 0
DYSURIA: 0

## 2022-09-27 ASSESSMENT — PAIN SCALES - GENERAL: PAINLEVEL: NO PAIN (0)

## 2022-09-27 NOTE — LETTER
[facility]  [address]  [facility] [address]  Phone: [phone]  Fax: [phone]    September 27, 2022        Ricardo Richardson  [facility] [place] [address]          To whom it may concern:    RE: Ricardo Richardson    Patient was seen and treated today at our clinic.    Please contact me for questions or concerns.      Sincerely,        Zeke Wise PA-C

## 2022-09-27 NOTE — PROGRESS NOTES
SUBJECTIVE:   CC: Ricardo is an 42 year old who presents for preventative health visit.       Patient has been advised of split billing requirements and indicates understanding: Yes  Ricardo is a pleasant 42-year-old male that presents to the clinic today for annual physical.  No significant past medical history.  He is not on chronic daily medications.  He has no questions or concerns regarding his chronic health today.  He does have a form for work to fill out today.  No concerns about anxiety or depression.  He is not a current smoker.  Social alcohol use.    Healthy Habits:     Getting at least 3 servings of Calcium per day:  Yes    Bi-annual eye exam:  Yes    Dental care twice a year:  NO    Sleep apnea or symptoms of sleep apnea:  Excessive snoring    Diet:  Regular (no restrictions)    Frequency of exercise:  None    Taking medications regularly:  Not Applicable    Medication side effects:  Not applicable    PHQ-2 Total Score: 0    Additional concerns today:  No        Today's PHQ-2 Score:   PHQ-2 ( 1999 [employer]) 9/27/2022   Q1: Little interest or pleasure in doing things 0   Q2: Feeling down, depressed or hopeless 0   PHQ-2 Score 0   PHQ-2 Total Score (12-17 Years)- Positive if 3 or more points; Administer PHQ-A if positive -   Q1: Little interest or pleasure in doing things Not at all   Q2: Feeling down, depressed or hopeless Not at all   PHQ-2 Score 0       Abuse: Current or Past(Physical, Sexual or Emotional)- No  Do you feel safe in your environment? Yes    Have you ever done Advance Care Planning? (For example, a Health Directive, POLST, or a discussion with a medical provider or your loved ones about your wishes): No, advance care planning information given to patient to review.  Patient declined advance care planning discussion at this time.    Social History     Tobacco Use     Smoking status: Former Smoker     Smokeless tobacco: Former User     Tobacco comment: off/on, quit 2016; about 5 pack years    Substance Use Topics     Alcohol use: Yes     Comment: Alcoholic Drinks/day: 1-2x/week     If you drink alcohol do you typically have >3 drinks per day or >7 drinks per week? No        Last PSA: No results found for: PSA    Reviewed orders with patient. Reviewed health maintenance and updated orders accordingly - Yes  Lab work is in process    Reviewed and updated as needed this visit by clinical staff   Tobacco  Allergies  Meds                Reviewed and updated as needed this visit by Provider                   Past Medical History:   Diagnosis Date     Essential hypertension, benign     Created by Conversion      Neoplasm of uncertain behavior of skin     Mole removed, benign Advised to have regular routine skin checks       Past Surgical History:   Procedure Laterality Date     MOLE REMOVAL       TONSILLECTOMY & ADENOIDECTOMY      as a child     OB History   No obstetric history on file.       Review of Systems   Constitutional: Negative for chills and fever.   HENT: Negative for congestion, ear pain, hearing loss and sore throat.    Eyes: Negative for pain and visual disturbance.   Respiratory: Negative for cough and shortness of breath.    Cardiovascular: Negative for chest pain, palpitations and peripheral edema.   Gastrointestinal: Negative for abdominal pain, constipation, diarrhea, heartburn, hematochezia and nausea.   Genitourinary: Negative for dysuria, frequency, genital sores, hematuria, impotence, penile discharge and urgency.   Musculoskeletal: Negative for arthralgias, joint swelling and myalgias.   Skin: Negative for rash.   Neurological: Negative for dizziness, weakness, headaches and paresthesias.   Psychiatric/Behavioral: Negative for mood changes. The patient is not nervous/anxious.      CONSTITUTIONAL: NEGATIVE for fever, chills, change in weight  INTEGUMENTARY/SKIN: NEGATIVE for worrisome rashes, moles or lesions  EYES: NEGATIVE for vision changes or irritation  ENT: NEGATIVE for ear,  "mouth and throat problems  RESP: NEGATIVE for significant cough or SOB  CV: NEGATIVE for chest pain, palpitations or peripheral edema  GI: NEGATIVE for nausea, abdominal pain, heartburn, or change in bowel habits   male: negative for dysuria, hematuria, decreased urinary stream, erectile dysfunction, urethral discharge  MUSCULOSKELETAL: NEGATIVE for significant arthralgias or myalgia  NEURO: NEGATIVE for weakness, dizziness or paresthesias  PSYCHIATRIC: NEGATIVE for changes in mood or affect    OBJECTIVE:   /78   Pulse 80   Ht 1.962 m (6' 5.25\")   Wt 104.9 kg (231 lb 4.8 oz)   SpO2 98%   BMI 27.25 kg/m      Physical Exam  Constitutional:       General: He is not in acute distress.     Appearance: He is well-developed.   HENT:      Right Ear: Tympanic membrane and external ear normal.      Left Ear: Tympanic membrane and external ear normal.      Nose: Nose normal.      Mouth/Throat:      Mouth: No oral lesions.      Pharynx: No oropharyngeal exudate.   Eyes:      General:         Right eye: No discharge.         Left eye: No discharge.      Conjunctiva/sclera: Conjunctivae normal.      Pupils: Pupils are equal, round, and reactive to light.   Neck:      Thyroid: No thyromegaly.      Trachea: No tracheal deviation.   Cardiovascular:      Rate and Rhythm: Normal rate and regular rhythm.      Pulses: Normal pulses.      Heart sounds: Normal heart sounds, S1 normal and S2 normal. No murmur heard.    No S3 or S4 sounds.   Pulmonary:      Effort: Pulmonary effort is normal. No respiratory distress.      Breath sounds: Normal breath sounds. No wheezing or rales.   Abdominal:      General: Bowel sounds are normal.      Palpations: Abdomen is soft. There is no mass.      Tenderness: There is no abdominal tenderness.   Musculoskeletal:         General: No deformity. Normal range of motion.      Cervical back: Neck supple.   Lymphadenopathy:      Cervical: No cervical adenopathy.   Skin:     General: Skin is warm " "and dry.      Findings: No lesion or rash.   Neurological:      Mental Status: He is alert and oriented to person, place, and time.      Motor: No abnormal muscle tone.      Deep Tendon Reflexes: Reflexes are normal and symmetric.   Psychiatric:         Speech: Speech normal.         Thought Content: Thought content normal.         Judgment: Judgment normal.         ASSESSMENT/PLAN:       ICD-10-CM    1. Annual physical exam  Z00.00 CBC with platelets     Comprehensive metabolic panel (BMP + Alb, Alk Phos, ALT, AST, Total. Bili, TP)     Lipid panel reflex to direct LDL Fasting   2. Screening for hyperlipidemia  Z13.220      #1 annual physical-we will update screening labs including a CBC, complete metabolic panel, and lipid    #2 hyperlipidemia screening-we will check a lipid panel today    #3 he did bring paperwork for me to fill out.  This was filled out and given to him to return to work.    Patient has been advised of split billing requirements and indicates understanding: Yes    COUNSELING:   Reviewed preventive health counseling, as reflected in patient instructions       Regular exercise       Healthy diet/nutrition       Vision screening    Estimated body mass index is 27.25 kg/m  as calculated from the following:    Height as of this encounter: 1.962 m (6' 5.25\").    Weight as of this encounter: 104.9 kg (231 lb 4.8 oz).     Weight management plan: Discussed healthy diet and exercise guidelines    He reports that he has quit smoking. He has quit using smokeless tobacco.      Counseling Resources:  ATP IV Guidelines  Pooled Cohorts Equation Calculator  FRAX Risk Assessment  ICSI Preventive Guidelines  Dietary Guidelines for Americans, 2010  USDA's MyPlate  ASA Prophylaxis  Lung CA Screening    HENRY Cottrell [facility]  "

## 2022-09-28 ENCOUNTER — TELEPHONE (OUTPATIENT)
Dept: FAMILY MEDICINE | Facility: CLINIC | Age: 42
End: 2022-09-28

## 2022-09-28 NOTE — TELEPHONE ENCOUNTER
----- Message from Zeke Wise PA-C sent at 9/28/2022  3:52 AM CDT -----  Please call pt with labs    CBC: White blood cell, red blood cell, platelets, and hemoglobin are stable.  CMP: Kidney function, liver function, electrolytes are stable.    Lipids: Lipid panel a little high. Work on diet  and exercise     The 10-year ASCVD risk score (Franklin FIERRO Jr., et al., 2013) is: 2.2%    Values used to calculate the score:      Age: 42 years      Sex: Male      Is Non- : No      Diabetic: No      Tobacco smoker: No      Systolic Blood Pressure: 130 mmHg      Is BP treated: No      HDL Cholesterol: 39 mg/dL      Total Cholesterol: 182 mg/dL

## 2022-09-28 NOTE — TELEPHONE ENCOUNTER
Left voicemail for patient to return call to clinic.    Please relay results from GRISEL Escobar below when patient calls back.     Corrie Guzman RN on 9/28/2022 at 11:18 AM

## 2022-09-28 NOTE — TELEPHONE ENCOUNTER
Patient informed of clinician's message. Patient noticed that on his AVS it said that he was fasting. Just wanted to let Prakash know that he was not fasting yesterday during visit.

## 2023-08-28 ENCOUNTER — PATIENT OUTREACH (OUTPATIENT)
Dept: CARE COORDINATION | Facility: CLINIC | Age: 43
End: 2023-08-28
Payer: COMMERCIAL

## 2023-09-28 ENCOUNTER — OFFICE VISIT (OUTPATIENT)
Dept: FAMILY MEDICINE | Facility: CLINIC | Age: 43
End: 2023-09-28
Payer: COMMERCIAL

## 2023-09-28 VITALS
OXYGEN SATURATION: 99 % | SYSTOLIC BLOOD PRESSURE: 138 MMHG | BODY MASS INDEX: 27.4 KG/M2 | HEIGHT: 76 IN | WEIGHT: 225 LBS | HEART RATE: 92 BPM | TEMPERATURE: 98.1 F | RESPIRATION RATE: 16 BRPM | DIASTOLIC BLOOD PRESSURE: 72 MMHG

## 2023-09-28 DIAGNOSIS — Z13.0 SCREENING FOR DEFICIENCY ANEMIA: ICD-10-CM

## 2023-09-28 DIAGNOSIS — Z12.11 SCREEN FOR COLON CANCER: ICD-10-CM

## 2023-09-28 DIAGNOSIS — Z11.59 NEED FOR HEPATITIS B SCREENING TEST: ICD-10-CM

## 2023-09-28 DIAGNOSIS — Z00.00 ROUTINE GENERAL MEDICAL EXAMINATION AT A HEALTH CARE FACILITY: Primary | ICD-10-CM

## 2023-09-28 DIAGNOSIS — Z83.3 FAMILY HISTORY OF DIABETES MELLITUS: ICD-10-CM

## 2023-09-28 DIAGNOSIS — Z13.1 SCREENING FOR DIABETES MELLITUS: ICD-10-CM

## 2023-09-28 DIAGNOSIS — E78.00 HIGH BLOOD CHOLESTEROL: ICD-10-CM

## 2023-09-28 DIAGNOSIS — Z23 INFLUENZA VACCINE NEEDED: ICD-10-CM

## 2023-09-28 LAB
ALBUMIN SERPL BCG-MCNC: 4.8 G/DL (ref 3.5–5.2)
ALP SERPL-CCNC: 70 U/L (ref 40–129)
ALT SERPL W P-5'-P-CCNC: 22 U/L (ref 0–70)
ANION GAP SERPL CALCULATED.3IONS-SCNC: 11 MMOL/L (ref 7–15)
AST SERPL W P-5'-P-CCNC: 25 U/L (ref 0–45)
BILIRUB SERPL-MCNC: 0.3 MG/DL
BUN SERPL-MCNC: 18.7 MG/DL (ref 6–20)
CALCIUM SERPL-MCNC: 9.4 MG/DL (ref 8.6–10)
CHLORIDE SERPL-SCNC: 106 MMOL/L (ref 98–107)
CHOLEST SERPL-MCNC: 178 MG/DL
CREAT SERPL-MCNC: 0.86 MG/DL (ref 0.67–1.17)
EGFRCR SERPLBLD CKD-EPI 2021: >90 ML/MIN/1.73M2
ERYTHROCYTE [DISTWIDTH] IN BLOOD BY AUTOMATED COUNT: 13.1 % (ref 10–15)
GLUCOSE SERPL-MCNC: 94 MG/DL (ref 70–99)
HBA1C MFR BLD: 5.4 % (ref 0–5.6)
HCO3 SERPL-SCNC: 25 MMOL/L (ref 22–29)
HCT VFR BLD AUTO: 44.3 % (ref 40–53)
HDLC SERPL-MCNC: 41 MG/DL
HGB BLD-MCNC: 14.7 G/DL (ref 13.3–17.7)
LDLC SERPL CALC-MCNC: 121 MG/DL
MCH RBC QN AUTO: 28.8 PG (ref 26.5–33)
MCHC RBC AUTO-ENTMCNC: 33.2 G/DL (ref 31.5–36.5)
MCV RBC AUTO: 87 FL (ref 78–100)
NONHDLC SERPL-MCNC: 137 MG/DL
PLATELET # BLD AUTO: 216 10E3/UL (ref 150–450)
POTASSIUM SERPL-SCNC: 4.3 MMOL/L (ref 3.4–5.3)
PROT SERPL-MCNC: 6.9 G/DL (ref 6.4–8.3)
RBC # BLD AUTO: 5.1 10E6/UL (ref 4.4–5.9)
SODIUM SERPL-SCNC: 142 MMOL/L (ref 135–145)
TRIGL SERPL-MCNC: 80 MG/DL
WBC # BLD AUTO: 5.9 10E3/UL (ref 4–11)

## 2023-09-28 PROCEDURE — 36415 COLL VENOUS BLD VENIPUNCTURE: CPT | Performed by: NURSE PRACTITIONER

## 2023-09-28 PROCEDURE — 99396 PREV VISIT EST AGE 40-64: CPT | Mod: 25 | Performed by: NURSE PRACTITIONER

## 2023-09-28 PROCEDURE — 90686 IIV4 VACC NO PRSV 0.5 ML IM: CPT | Performed by: NURSE PRACTITIONER

## 2023-09-28 PROCEDURE — 83036 HEMOGLOBIN GLYCOSYLATED A1C: CPT | Performed by: NURSE PRACTITIONER

## 2023-09-28 PROCEDURE — 85027 COMPLETE CBC AUTOMATED: CPT | Performed by: NURSE PRACTITIONER

## 2023-09-28 PROCEDURE — 80053 COMPREHEN METABOLIC PANEL: CPT | Performed by: NURSE PRACTITIONER

## 2023-09-28 PROCEDURE — 80061 LIPID PANEL: CPT | Performed by: NURSE PRACTITIONER

## 2023-09-28 PROCEDURE — 86706 HEP B SURFACE ANTIBODY: CPT | Performed by: NURSE PRACTITIONER

## 2023-09-28 PROCEDURE — 90471 IMMUNIZATION ADMIN: CPT | Performed by: NURSE PRACTITIONER

## 2023-09-28 ASSESSMENT — ENCOUNTER SYMPTOMS
DIARRHEA: 0
HEMATOCHEZIA: 0
CONSTIPATION: 0
MYALGIAS: 0
HEARTBURN: 0
FEVER: 0
ARTHRALGIAS: 0
CHILLS: 0
SORE THROAT: 0
DYSURIA: 0
JOINT SWELLING: 0
ABDOMINAL PAIN: 0
HEADACHES: 0
EYE PAIN: 0
SHORTNESS OF BREATH: 0
DIZZINESS: 0
PARESTHESIAS: 0
COUGH: 0
HEMATURIA: 0
NAUSEA: 0
FREQUENCY: 0
PALPITATIONS: 0
NERVOUS/ANXIOUS: 0
WEAKNESS: 0

## 2023-09-28 ASSESSMENT — PAIN SCALES - GENERAL: PAINLEVEL: NO PAIN (0)

## 2023-09-28 NOTE — PROGRESS NOTES
SUBJECTIVE:   CC: Ricardo is an 43 year old who presents for preventative health visit.       9/28/2023     1:53 PM   Additional Questions   Roomed by Leonie CROFT       Healthy Habits:     Getting at least 3 servings of Calcium per day:  Yes    Bi-annual eye exam:  Yes    Dental care twice a year:  NO    Sleep apnea or symptoms of sleep apnea:  Excessive snoring    Diet:  Regular (no restrictions)    Frequency of exercise:  None    Taking medications regularly:  Not Applicable    Medication side effects:  Not applicable    Additional concerns today:  No      Today's PHQ-2 Score:       9/28/2023     1:48 PM   PHQ-2 ( 1999 Pfizer)   Q1: Little interest or pleasure in doing things 0   Q2: Feeling down, depressed or hopeless 0   PHQ-2 Score 0   Q1: Little interest or pleasure in doing things Not at all   Q2: Feeling down, depressed or hopeless Not at all   PHQ-2 Score 0           Social History     Tobacco Use    Smoking status: Former    Smokeless tobacco: Former    Tobacco comments:     off/on, quit 2016; about 5 pack years   Substance Use Topics    Alcohol use: Yes     Comment: Alcoholic Drinks/day: 1-2x/week             9/28/2023     1:48 PM   Alcohol Use   Prescreen: >3 drinks/day or >7 drinks/week? No          No data to display                Last PSA: No results found for: PSA    Reviewed orders with patient. Reviewed health maintenance and updated orders accordingly - Yes  Lab work is in process    Reviewed and updated as needed this visit by clinical staff   Tobacco  Allergies  Meds              Reviewed and updated as needed this visit by Provider                     Review of Systems   Constitutional:  Negative for chills and fever.   HENT:  Negative for congestion, ear pain, hearing loss and sore throat.    Eyes:  Negative for pain and visual disturbance.   Respiratory:  Negative for cough and shortness of breath.    Cardiovascular:  Negative for chest pain, palpitations and peripheral edema.  "  Gastrointestinal:  Negative for abdominal pain, constipation, diarrhea, heartburn, hematochezia and nausea.   Genitourinary:  Negative for dysuria, frequency, genital sores, hematuria, impotence, penile discharge and urgency.   Musculoskeletal:  Negative for arthralgias, joint swelling and myalgias.   Skin:  Negative for rash.   Neurological:  Negative for dizziness, weakness, headaches and paresthesias.   Psychiatric/Behavioral:  Negative for mood changes. The patient is not nervous/anxious.          OBJECTIVE:   BP (!) 149/85 (BP Location: Right arm, Patient Position: Sitting, Cuff Size: Adult Regular)   Pulse 92   Temp 98.1  F (36.7  C) (Temporal)   Resp 16   Ht 1.93 m (6' 4\")   Wt 102.1 kg (225 lb)   SpO2 99%   BMI 27.39 kg/m      Physical Exam  GENERAL: healthy, alert and no distress  EYES: Eyes grossly normal to inspection, PERRL and conjunctivae and sclerae normal  HENT: ear canals and TM's normal, nose and mouth without ulcers or lesions  NECK: no adenopathy, no asymmetry, masses, or scars and thyroid normal to palpation  RESP: lungs clear to auscultation - no rales, rhonchi or wheezes  CV: regular rate and rhythm, normal S1 S2, no S3 or S4, no murmur, click or rub, no peripheral edema and peripheral pulses strong  ABDOMEN: soft, nontender, no hepatosplenomegaly, no masses and bowel sounds normal  MS: no gross musculoskeletal defects noted, no edema  SKIN: no suspicious lesions or rashes  NEURO: Normal strength and tone, mentation intact and speech normal  PSYCH: mentation appears normal, affect normal/bright        ASSESSMENT/PLAN:   (Z00.00) Routine general medical examination at a health care facility  (primary encounter diagnosis)  Comment: Reviewed medical/social/family history and health maintenance   Plan:     (Z12.11) Screen for colon cancer  Comment:   Plan: Colonoscopy Screening  Referral            (Z13.1) Screening for diabetes mellitus  Comment:   Plan: Comprehensive metabolic " "panel (BMP + Alb, Alk         Phos, ALT, AST, Total. Bili, TP)            (Z83.3) Family history of diabetes mellitus  Comment:   Plan: Hemoglobin A1c            (Z13.0) Screening for deficiency anemia  Comment:   Plan: CBC with platelets            (E78.00) High blood cholesterol  Comment:   Plan: Lipid panel reflex to direct LDL Non-fasting            (Z23) Influenza vaccine needed  Comment:   Plan: INFLUENZA VACCINE IM > 6 MONTHS VALENT IIV4         (AFLURIA/FLUZONE)            (Z11.59) Need for hepatitis B screening test  Comment:   Plan: Hepatitis B Surface Antibody            Patient has been advised of split billing requirements and indicates understanding: Yes      COUNSELING:   Reviewed preventive health counseling, as reflected in patient instructions      BMI:   Estimated body mass index is 27.39 kg/m  as calculated from the following:    Height as of this encounter: 1.93 m (6' 4\").    Weight as of this encounter: 102.1 kg (225 lb).         He reports that he has quit smoking. He has quit using smokeless tobacco.            CARI Patrick CNP  [facility]  "

## 2023-09-29 ENCOUNTER — MYC MEDICAL ADVICE (OUTPATIENT)
Dept: FAMILY MEDICINE | Facility: CLINIC | Age: 43
End: 2023-09-29
Payer: COMMERCIAL

## 2023-09-29 LAB
HBV SURFACE AB SERPL IA-ACNC: 0 M[IU]/ML
HBV SURFACE AB SERPL IA-ACNC: NONREACTIVE M[IU]/ML

## 2023-11-16 ENCOUNTER — TELEPHONE (OUTPATIENT)
Dept: CARDIOLOGY | Facility: CLINIC | Age: 43
End: 2023-11-16
Payer: COMMERCIAL

## 2023-11-16 DIAGNOSIS — Z82.49 FAMILY HISTORY OF ARRHYTHMOGENIC LEFT VENTRICULAR CARDIOMYOPATHY: Primary | ICD-10-CM

## 2023-11-27 NOTE — TELEPHONE ENCOUNTER
New Congenital/CV Genetics Patient Intake    Referred by: Based on family member, mother passed she was seen by Dr. José. Stated his mother did genetic testing came back inconclusive. Work up requests for family to get seen. His mother DX: Arrhythmia, left ventriclar possibly does not recall.   1.  Patient's cardiac history:  none  2.  Previous cardiac procedures (heart catherizations, surgeries): none  3.  Patient's previous cardiologist and when last visit was: none   4.  Recent testing (Echo, MRI, CT, Stress tests): none, just annual physicals non recent concerns.   5.  Any present concerns: not really, pretty bad health anxiety, some days very anxious not sure if they are cardiac symptoms   6.  Closet location for patient to be seen ([facility]): [facility]  7.  Any recent testing at the [facility]: none,   8.  Patient's E mail or Fax number to send JOSEPH:  [email or web address]  9.  Update chart with patient's PCP: Dr. Dai, out of [facility] in [place].   10. Any genetic testing done within the family: pts mother had testing done and came back inconclusive, have been recommended for Family to get tested, pts sister recently got scheduled and got tested and scheduled with Dr. José and has wore a heart monitor.,   11. Reason for referral: genetic counseling/ cardiac work up.

## 2023-11-28 NOTE — TELEPHONE ENCOUNTER
Date: 11/28/2023    Time of Call: 10:01 AM     Diagnosis:  hx of ALVC in family.      [ TORB ] Ordering provider: Dr. Brianna José  Order: Establish care with Dr. José with 2 week ZioPatch and complete ECHO prior. Referral to [facility] for genetic counseling.     Order received by: Maryjo GROVER RN     Follow-up/additional notes: referring to Dr. José because patient's mother (now passed) was a patient of Dr. José for ALVC and HF.

## 2023-12-01 ENCOUNTER — ANCILLARY PROCEDURE (OUTPATIENT)
Dept: CARDIOLOGY | Facility: CLINIC | Age: 43
End: 2023-12-01
Attending: STUDENT IN AN ORGANIZED HEALTH CARE EDUCATION/TRAINING PROGRAM
Payer: COMMERCIAL

## 2023-12-01 DIAGNOSIS — Z82.49 FAMILY HISTORY OF ARRHYTHMOGENIC LEFT VENTRICULAR CARDIOMYOPATHY: ICD-10-CM

## 2023-12-13 ENCOUNTER — VIRTUAL VISIT (OUTPATIENT)
Dept: CARDIOLOGY | Facility: CLINIC | Age: 43
End: 2023-12-13
Attending: GENETIC COUNSELOR, MS
Payer: COMMERCIAL

## 2023-12-13 VITALS — HEIGHT: 76 IN | WEIGHT: 208.5 LBS | BODY MASS INDEX: 25.39 KG/M2

## 2023-12-13 DIAGNOSIS — Z82.49 FAMILY HISTORY OF ARRHYTHMOGENIC LEFT VENTRICULAR CARDIOMYOPATHY: ICD-10-CM

## 2023-12-13 PROCEDURE — 96040 HC GENETIC COUNSELING, EACH 30 MINUTES: CPT | Mod: GT,95 | Performed by: GENETIC COUNSELOR, MS

## 2023-12-13 PROCEDURE — 999N000069 HC STATISTIC GENETIC COUNSELING, < 16 MIN: Mod: GT,95 | Performed by: GENETIC COUNSELOR, MS

## 2023-12-13 RX ORDER — HYDROXYZINE HYDROCHLORIDE 25 MG/1
25 TABLET, FILM COATED ORAL
COMMUNITY
Start: 2023-11-29 | End: 2023-12-14

## 2023-12-13 ASSESSMENT — PAIN SCALES - GENERAL: PAINLEVEL: NO PAIN (0)

## 2023-12-13 NOTE — LETTER
"2023      RE: Ricardo Richardson  [address] [place] [address]       Dear Colleague,    Thank you for the opportunity to participate in the care of your patient, Ricardo Richardson, at the [facility] [place] at [facility]. Please see a copy of my visit note below.    Name:  Ricardo Richardson \"Ricardo\"  :   1980  MRN:   [id number]  Date of service: Dec 13, 2023  Primary Provider: Remy Dai  Referring Provider: Brianna José    PRESENTING INFORMATION   Reason for consultation:  A consultation in the [facility] was requested for Ricardo, a 43 year old male, for evaluation of The encounter diagnosis was Family history of arrhythmogenic left ventricular cardiomyopathy.     Ricardo was unaccompanied to this visit.    History is obtained from Patient and electronic health record. I met with Ricardo at his request to obtain a personal and family history, discuss possible genetic contributions to his symptoms, and to obtain informed consent for genetic testing if indicated.      ASSESSMENT & PLAN  Ricardo is a 43 year old-year old male with a family history of arrhythmogenic cardiomyopathy in his mother, Le Muir  1957, who recently passed away. She had genetic testing performed in May 2023 which identified an ANK2 variant of uncertain significance.     Ricardo's mother signed a consent to communicate form with her children. Results were reviewed with Ricardo today.     Arrhythmogenic cardiomyopathy is mainly characterized by fibro or fibrofatty myocardial replacement which can cause progressive global/regional ventricular dysfunction, and high burden of ventricular arrhythmias. Structural alterations can affect left, right, or both ventricles which lead to three recognized phenotypic variants: the dominant-right ( the classic arrhythmogenic right ventricular cardiomyopathy --ARVC) variant, " the biventricular variant (Biv ACM), and the dominant-left variant (also known as  arrhythmogenic left ventricular cardiomyopathy --ALVC). Arrhythmogenic cardiomyopathy can be caused by variants in multiple genes. Most are inherited in an autosomal dominant fashion.  Genetic testing was performed for Ricardo's mother to look for these variants.  It did not establish a genetic diagnosis.  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    ANK2 is a scaffolding protein for ion channels and transporters, as well as an interacting protein for structural and signaling proteins. Certain loss-of-function ANK2 variants are associated with cardiac conditions who exhibit autosomal-dominant inheritance with incomplete penetrance and variable expressivity. Features include a predisposition to supraventricular and ventricular arrhythmias, arrhythmogenic cardiomyopathy, congenital and adult-onset structural heart disease, and sudden death. It has been reported in families with LQTS, WPW, ARVC, DCM, Another independent group of ANK2 variants are associated with increased risk for distinct neurological phenotypes, including epilepsy and autism spectrum disorders. It is also being investigated as a metabolic phenotype due to it's role in regulation of fat and glucose metabolism.  There is little information about the variant identified in Ricardo's mother: It is a conserved amino acid.  In silica predictions are inconclusive, and the amino acid substitution is similar properties.  Because this variant is uncertain, does not provide a genetic diagnosis and cascade genetic testing for relatives is not recommended.  If other individuals in the family are later found to have cardiomyopathy/arrhythmia, we can revisit genetic testing.    Keep appointment with Dr. José  Contact information was provided should any questions arise in the future.      "  HPI:  Ricardo is a 43 year old-year old male with a family history of arrhythmogenic cardiomyopathy in his mother.  Ricardo's mother had left-dominant arrhythmogenic cardiomyopathy, LVEF 36% and RVEF 50%, with extensive scar. She was previously followed by Dr. José. Mother has chronic systolic HF (EF 15-20%), cardiac arrest 2/2 VT s/p ICD 1/2023, OLI, CKD3a. She was admitted in October for VT arrest s/p x1 defib. She unfortunately decompensated and was not a candidate for heart transplant/LVAD. She had genetic testing with Corrie Soto in May 2023 which returned uncertain (a variant of uncertain significance was identified in ANK2). Additional history is significant for osteopenia, major depressive disorder, and type 1 diabetes.     Ricardo has not had any cardiac screening.  He has an appointment with Dr. José January 19, 2024. Echo ordered. Zio patch in process.  He is interested in being evaluated for arrhythmogenic cardiomyopathy.  His main concern is his son, who is 3 years old.    He denied chest pain, dizziness, palpitations, or syncope. He has anxiety that he \"carries in his chest\". He recently presented to the emergency department due to anxiety.  He was discharged with hydroxyzine. Follow-up with his primary care providers scheduled 12/14. Ricardo denies a history of developmental delays, intellectual disability, learning disability, vision loss, hearing loss, seizures, hypotonia, muscle weakness, birth defects, poor growth, skeletal concerns, hypermobility, joint dislocations, or other major health concerns. He has astigmatism. He had PE tubes as a child. He has eustachian tube dysfunction and allergies.  He is tall (MPH 72.5\") He is 6'4\".      Patient Active Problem List   Diagnosis   (none) - all problems resolved or deleted       Pertinent studies/abnormal test results:   No history of genetic testing    Imaging results:   No cardiac imaging  No results found for this or any previous visit (from the " past 744 hour(s)).  No results found for any visits on 23.  No results found for this or any previous visit (from the past 8760 hour(s)).    Past Medical History:  Past Medical History:   Diagnosis Date     Essential hypertension, benign     Created by Conversion      Neoplasm of uncertain behavior of skin     Mole removed, benign Advised to have regular routine skin checks        Past Surgical History:  Past Surgical History:   Procedure Laterality Date     MOLE REMOVAL       TONSILLECTOMY & ADENOIDECTOMY      as a child        FAMILY HISTORY  A three generation pedigree was obtained today and scanned into the EMR. The following information is significant:    Children  3-year-old son who is well.  He has a history of strabismus.  He has not had any cardiac imaging    Siblings  Full siblings: Sister, Nasreen, who is well.  She has a Zio patch pending.  An echo has been ordered.  Paternal half siblings: None  Maternal half siblings: None    Maternal Family  Mother, Le Muir  1957: Passed due to left dominant arrhythmogenic cardiomyopathy at 68.  She had an ICD in place.  She is found to have a variant of uncertain significance in ANK2.  She also had a history of hyperlipidemia, OLI, type 1 diabetes, and depression  Maternal grandfather: Had cardiomyopathy noted on death certificate.  Had a pacemaker in place.  Passed due to myelodysplastic syndrome at 90  Maternal grandmother: Passed in her eighties due to unspecified heart issues.  She had a heart monitor.  Maternal aunts/uncles: Well.  No cardiac screening  Maternal cousins: Well  Maternal ancestry: Deferred    Paternal Family  Father, Data Unavailable: Well  Paternal grandfather: No cardiomyopathy/heart issues  Paternal grandmother: No cardiomyopathy/heart issues  Paternal aunts/uncles: Deferred  Paternal cousins: Deferred  Paternal ancestry: Deferred    Family history is otherwise negative for cardiomyopathy, heart failure, arrhythmias,  syncope/dizziness/palpitations, cardiac amyloidosis/fibrosis, SCD (especially <35y)/SIDS/ demise, skeletal muscle concerns such as myopathy, seizures, delays, intellectual disability, growth concerns, birth defects, and known genetic conditions. Consanguinity is denied.    DISCUSSION  Genetics  Today we reviewed that our genetic material or DNA is responsible for how our bodies grow and develop. It can be thought of as an instruction manual. This instruction manual is made up of chapters called genes. Our genes are inherited on structures called chromosomes, of which we have 23 pairs for a total of 46. For each chromosome pair, one copy is inherited from the mother and one is inherited from the father. The chromosome pairs are numbered from 1 to 22, and the 23rd pair of chromsomes is called the sex chromsomes. These determine if we are a male or female.     Changes in the chromosomes or in the DNA sequence of a gene can cause the signs and symptoms of a genetic condition because the instructions it is providing to the body have been altered. This can be a small spelling error in the gene, a large duplicated piece of information, or a large missing piece of information.     Genetic Testing  Today we reviewed the potential results from genetic testing including a positive, negative, or variant of uncertain significance.     One possibility is a change(s) could be seen that is known to cause the gene to malfunction and is therefore consistent with a genetic diagnosis this is considered a positive result.  A positive result may provide more information on appropriate clinical management and allow for genetic testing and relatives  It is also possible that no change(s) are seen in the genes tested.  This is considered a negative result.  A negative result would not completely rule out a possible genetic cause  Not all changes in our genes cause disease.  Sometimes, it can be difficult for the laboratory to  determine whether or not a change that is found contributes to the patient's symptoms.  If the meaning of a particular gene change is unknown, the lab classifies the result as a variant of unknown significance (VUS).  This does not provide a genetic diagnosis.  It is important to follow-up on these uncertain results over time.  As we learn more about the variant of uncertain significance, we will do reclassified as either disease-causing (a genetic diagnosis) or benign (normal)    DISCUSSION  Genetics  Today we reviewed that our genetic material or DNA is responsible for how our bodies grow and develop. It can be thought of as an instruction manual. This instruction manual is made up of chapters called genes. Our genes are inherited on structures called chromosomes, of which we have 23 pairs for a total of 46. For each chromosome pair, one copy is inherited from the mother and one is inherited from the father. The chromosome pairs are numbered from 1 to 22, and the 23rd pair of chromsomes is called the sex chromsomes. These determine if we are a male or female.     Changes in the chromosomes or in the DNA sequence of a gene can cause the signs and symptoms of a genetic condition because the instructions it is providing to the body have been altered. This can be a small spelling error in the gene, a large duplicated piece of information, or a large missing piece of information.     Cardiomyopathy  There are various types of cardiomyopathies, which are disorders of the heart muscle. This reduces the heart's ability to provide oxygen-rich blood to the body, which may lead to an abnormal heart sound during a heartbeat (heart murmur) and other signs and symptoms of the condition. These include chest pain; shortness of breath, especially with physical exertion; a sensation of fluttering or pounding in the chest (palpitations); lightheadedness; dizziness; and fainting. Significant health risks exist including arrhythmias,  increased risk of sudden death, or heart failure, which may require heart transplantation. Symptoms are variable, even within the same family.  It can develop at any time throughout life. Some individuals may not develop symptoms despite having the genetic cause for cardiomyopathy (reduced penetrance).     Arrhythmogenic Cardiomyopathy  Arrhythmogenic cardiomyopathy is mainly characterized by fibro or fibrofatty replacement of the heart muscle (myocardium), which can cause progressive dysfunction of the lower chambers of the heart, called the ventricles, and arrhythmias. It generally begins in the second decade of life or later. Structural alterations can affect left, right, or both ventricles. There are different names for the condition depending on which ventricles are affected: dominant-right (arrhythmogenic right ventricular cardiomyopathy (ARVC)), dominant-left (arrhythmogenic left ventricular cardiomyopathy (ALVC), or biventricular (Biv ACM).  Management includes cardiac monitoring via echo, MRI, and EKG. Medications and surgery (e.g. ICD or heart transplants) are considered depending on a patient's status. Lifestyle modifications are made (frequent/intense physical exercise and intense competitive athletic activity is discouraged because of the strain caused on the right heart; diet; managing additional risk factors like high blood pressure).    Arrhythmogenic cardiomyopathy can be caused by variants in multiple genes incuding but not limited to PKP2, DSP, DSG2, DSC2, JUP, TMEM43, PLN, FLNC, SHERITA, LMNA. Most are inherited in an autosomal dominant fashion, meaning there is a 50% chance that a variant in one of these genes would be passed to a child. Identification of the specific genetic variant can guide the clinical decisions in some scenarios. Genetic testing can also be beneficial in identifying at risk relatives:    If a pathogenic variant is identified, cascade testing and relatives can be performed.   Individuals with a variant would need to be followed periodically by cardiology.  Individuals who do not have the variant do not need to be followed unless new concerns arise.   If genetic testing has not been performed or did not identify a pathogenic variant, screening for cardiac involvement (e.g. echo/MRI/EKG) is recommended for asymptomatic at-risk first-degree relatives. This is performed every one to three years after age ten years old, depending on the cardiologist's recommendations.    Because Ricardo's mother's genetic testing did not provide her with a genetic diagnosis, genetic testing for relatives is not recommended.  It is still important that relatives like Ricardo, his sister, and his mother's siblings received cardiac screening with a cardiologist because they are still at an increased risk of arrhythmogenic cardiomyopathy.  Ricardo has a visit planned in January.      Next 5 appointments (look out 90 days)      Dec 14, 2023 12:40 PM  (Arrive by 12:20 PM)  Provider Visit with CARI Montesinos CNP  [facility] ([facility]) [address] [facility] [place] [address] [id number] [phone]            Estefani Peterson [facility]  Genetic Counselor   [facility]   Phone: [phone]  Pager: [phone]            Approximate Time Spent in Consultation: 40 min         This note was written with the assistance of voice recognition software and may contain occasional typographic errors. Please contact our office if you identify errors requiring correction.    Virtual Visit Details    Type of service:  Video Visit     Originating Location (pt. Location): Home    Distant Location (provider location):  On-site  Platform used for Video Visit: Odalis

## 2023-12-13 NOTE — LETTER
"2023       RE: Ricardo Richardson  [address] [place] [address]     Dear Colleague,    Thank you for referring your patient, Ricardo Richardson, to the [facility]. Please see a copy of my visit note below.    Name:  Ricardo Richardson \"Ricardo\"  :   1980  MRN:   [id number]  Date of service: Dec 13, 2023  Primary Provider: Remy Dai  Referring Provider: Brianna José    PRESENTING INFORMATION   Reason for consultation:  A consultation in the [facility] was requested for Ricardo, a 43 year old male, for evaluation of The encounter diagnosis was Family history of arrhythmogenic left ventricular cardiomyopathy.     Ricardo was unaccompanied to this visit.    History is obtained from Patient and electronic health record. I met with Ricardo at his request to obtain a personal and family history, discuss possible genetic contributions to his symptoms, and to obtain informed consent for genetic testing if indicated.      ASSESSMENT & PLAN  Ricardo is a 43 year old-year old male with a family history of arrhythmogenic cardiomyopathy in his mother, Le Muir  1957, who recently passed away. She had genetic testing performed in May 2023 which identified an ANK2 variant of uncertain significance.     Ricardo's mother signed a consent to communicate form with her children. Results were reviewed with Ricardo today.     Arrhythmogenic cardiomyopathy is mainly characterized by fibro or fibrofatty myocardial replacement which can cause progressive global/regional ventricular dysfunction, and high burden of ventricular arrhythmias. Structural alterations can affect left, right, or both ventricles which lead to three recognized phenotypic variants: the dominant-right ( the classic arrhythmogenic right ventricular cardiomyopathy --ARVC) variant, the biventricular variant (Biv ACM), " and the dominant-left variant (also known as  arrhythmogenic left ventricular cardiomyopathy --ALVC). Arrhythmogenic cardiomyopathy can be caused by variants in multiple genes. Most are inherited in an autosomal dominant fashion.  Genetic testing was performed for Ricardo's mother to look for these variants.  It did not establish a genetic diagnosis.  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    ANK2 is a scaffolding protein for ion channels and transporters, as well as an interacting protein for structural and signaling proteins. Certain loss-of-function ANK2 variants are associated with cardiac conditions who exhibit autosomal-dominant inheritance with incomplete penetrance and variable expressivity. Features include a predisposition to supraventricular and ventricular arrhythmias, arrhythmogenic cardiomyopathy, congenital and adult-onset structural heart disease, and sudden death. It has been reported in families with LQTS, WPW, ARVC, DCM, Another independent group of ANK2 variants are associated with increased risk for distinct neurological phenotypes, including epilepsy and autism spectrum disorders. It is also being investigated as a metabolic phenotype due to it's role in regulation of fat and glucose metabolism.  There is little information about the variant identified in Ricardo's mother: It is a conserved amino acid.  In silica predictions are inconclusive, and the amino acid substitution is similar properties.  Because this variant is uncertain, does not provide a genetic diagnosis and cascade genetic testing for relatives is not recommended.  If other individuals in the family are later found to have cardiomyopathy/arrhythmia, we can revisit genetic testing.    Keep appointment with Dr. José  Contact information was provided should any questions arise in the future.       HPI:  Ricardo is a 43 year old-year  "old male with a family history of arrhythmogenic cardiomyopathy in his mother.  Ricardo's mother had left-dominant arrhythmogenic cardiomyopathy, LVEF 36% and RVEF 50%, with extensive scar. She was previously followed by Dr. José. Mother has chronic systolic HF (EF 15-20%), cardiac arrest 2/2 VT s/p ICD 1/2023, OLI, CKD3a. She was admitted in October for VT arrest s/p x1 defib. She unfortunately decompensated and was not a candidate for heart transplant/LVAD. She had genetic testing with Corrie Soto in May 2023 which returned uncertain (a variant of uncertain significance was identified in ANK2). Additional history is significant for osteopenia, major depressive disorder, and type 1 diabetes.     Ricardo has not had any cardiac screening.  He has an appointment with Dr. José January 19, 2024. Echo ordered. Zio patch in process.  He is interested in being evaluated for arrhythmogenic cardiomyopathy.  His main concern is his son, who is 3 years old.    He denied chest pain, dizziness, palpitations, or syncope. He has anxiety that he \"carries in his chest\". He recently presented to the emergency department due to anxiety.  He was discharged with hydroxyzine. Follow-up with his primary care providers scheduled 12/14. Ricardo denies a history of developmental delays, intellectual disability, learning disability, vision loss, hearing loss, seizures, hypotonia, muscle weakness, birth defects, poor growth, skeletal concerns, hypermobility, joint dislocations, or other major health concerns. He has astigmatism. He had PE tubes as a child. He has eustachian tube dysfunction and allergies.  He is tall (MPH 72.5\") He is 6'4\".      Patient Active Problem List   Diagnosis   (none) - all problems resolved or deleted       Pertinent studies/abnormal test results:   No history of genetic testing    Imaging results:   No cardiac imaging  No results found for this or any previous visit (from the past 744 hour(s)).  No results found " for any visits on 23.  No results found for this or any previous visit (from the past 8760 hour(s)).    Past Medical History:  Past Medical History:   Diagnosis Date     Essential hypertension, benign     Created by Conversion      Neoplasm of uncertain behavior of skin     Mole removed, benign Advised to have regular routine skin checks        Past Surgical History:  Past Surgical History:   Procedure Laterality Date     MOLE REMOVAL       TONSILLECTOMY & ADENOIDECTOMY      as a child        FAMILY HISTORY  A three generation pedigree was obtained today and scanned into the EMR. The following information is significant:    Children  3-year-old son who is well.  He has a history of strabismus.  He has not had any cardiac imaging    Siblings  Full siblings: Sister, Nasreen, who is well.  She has a Zio patch pending.  An echo has been ordered.  Paternal half siblings: None  Maternal half siblings: None    Maternal Family  Mother, Le Muir  1957: Passed due to left dominant arrhythmogenic cardiomyopathy at 68.  She had an ICD in place.  She is found to have a variant of uncertain significance in ANK2.  She also had a history of hyperlipidemia, OLI, type 1 diabetes, and depression  Maternal grandfather: Had cardiomyopathy noted on death certificate.  Had a pacemaker in place.  Passed due to myelodysplastic syndrome at 90  Maternal grandmother: Passed in her eighties due to unspecified heart issues.  She had a heart monitor.  Maternal aunts/uncles: Well.  No cardiac screening  Maternal cousins: Well  Maternal ancestry: Deferred    Paternal Family  Father, Data Unavailable: Well  Paternal grandfather: No cardiomyopathy/heart issues  Paternal grandmother: No cardiomyopathy/heart issues  Paternal aunts/uncles: Deferred  Paternal cousins: Deferred  Paternal ancestry: Deferred    Family history is otherwise negative for cardiomyopathy, heart failure, arrhythmias, syncope/dizziness/palpitations, cardiac  amyloidosis/fibrosis, SCD (especially <35y)/SIDS/ demise, skeletal muscle concerns such as myopathy, seizures, delays, intellectual disability, growth concerns, birth defects, and known genetic conditions. Consanguinity is denied.    DISCUSSION  Genetics  Today we reviewed that our genetic material or DNA is responsible for how our bodies grow and develop. It can be thought of as an instruction manual. This instruction manual is made up of chapters called genes. Our genes are inherited on structures called chromosomes, of which we have 23 pairs for a total of 46. For each chromosome pair, one copy is inherited from the mother and one is inherited from the father. The chromosome pairs are numbered from 1 to 22, and the 23rd pair of chromsomes is called the sex chromsomes. These determine if we are a male or female.     Changes in the chromosomes or in the DNA sequence of a gene can cause the signs and symptoms of a genetic condition because the instructions it is providing to the body have been altered. This can be a small spelling error in the gene, a large duplicated piece of information, or a large missing piece of information.     Genetic Testing  Today we reviewed the potential results from genetic testing including a positive, negative, or variant of uncertain significance.     One possibility is a change(s) could be seen that is known to cause the gene to malfunction and is therefore consistent with a genetic diagnosis this is considered a positive result.  A positive result may provide more information on appropriate clinical management and allow for genetic testing and relatives  It is also possible that no change(s) are seen in the genes tested.  This is considered a negative result.  A negative result would not completely rule out a possible genetic cause  Not all changes in our genes cause disease.  Sometimes, it can be difficult for the laboratory to determine whether or not a change that is  found contributes to the patient's symptoms.  If the meaning of a particular gene change is unknown, the lab classifies the result as a variant of unknown significance (VUS).  This does not provide a genetic diagnosis.  It is important to follow-up on these uncertain results over time.  As we learn more about the variant of uncertain significance, we will do reclassified as either disease-causing (a genetic diagnosis) or benign (normal)    DISCUSSION  Genetics  Today we reviewed that our genetic material or DNA is responsible for how our bodies grow and develop. It can be thought of as an instruction manual. This instruction manual is made up of chapters called genes. Our genes are inherited on structures called chromosomes, of which we have 23 pairs for a total of 46. For each chromosome pair, one copy is inherited from the mother and one is inherited from the father. The chromosome pairs are numbered from 1 to 22, and the 23rd pair of chromsomes is called the sex chromsomes. These determine if we are a male or female.     Changes in the chromosomes or in the DNA sequence of a gene can cause the signs and symptoms of a genetic condition because the instructions it is providing to the body have been altered. This can be a small spelling error in the gene, a large duplicated piece of information, or a large missing piece of information.     Cardiomyopathy  There are various types of cardiomyopathies, which are disorders of the heart muscle. This reduces the heart's ability to provide oxygen-rich blood to the body, which may lead to an abnormal heart sound during a heartbeat (heart murmur) and other signs and symptoms of the condition. These include chest pain; shortness of breath, especially with physical exertion; a sensation of fluttering or pounding in the chest (palpitations); lightheadedness; dizziness; and fainting. Significant health risks exist including arrhythmias, increased risk of sudden death, or heart  failure, which may require heart transplantation. Symptoms are variable, even within the same family.  It can develop at any time throughout life. Some individuals may not develop symptoms despite having the genetic cause for cardiomyopathy (reduced penetrance).     Arrhythmogenic Cardiomyopathy  Arrhythmogenic cardiomyopathy is mainly characterized by fibro or fibrofatty replacement of the heart muscle (myocardium), which can cause progressive dysfunction of the lower chambers of the heart, called the ventricles, and arrhythmias. It generally begins in the second decade of life or later. Structural alterations can affect left, right, or both ventricles. There are different names for the condition depending on which ventricles are affected: dominant-right (arrhythmogenic right ventricular cardiomyopathy (ARVC)), dominant-left (arrhythmogenic left ventricular cardiomyopathy (ALVC), or biventricular (Biv ACM).  Management includes cardiac monitoring via echo, MRI, and EKG. Medications and surgery (e.g. ICD or heart transplants) are considered depending on a patient's status. Lifestyle modifications are made (frequent/intense physical exercise and intense competitive athletic activity is discouraged because of the strain caused on the right heart; diet; managing additional risk factors like high blood pressure).    Arrhythmogenic cardiomyopathy can be caused by variants in multiple genes incuding but not limited to PKP2, DSP, DSG2, DSC2, JUP, TMEM43, PLN, FLNC, SHERITA, LMNA. Most are inherited in an autosomal dominant fashion, meaning there is a 50% chance that a variant in one of these genes would be passed to a child. Identification of the specific genetic variant can guide the clinical decisions in some scenarios. Genetic testing can also be beneficial in identifying at risk relatives:    If a pathogenic variant is identified, cascade testing and relatives can be performed.  Individuals with a variant would need to be  followed periodically by cardiology.  Individuals who do not have the variant do not need to be followed unless new concerns arise.   If genetic testing has not been performed or did not identify a pathogenic variant, screening for cardiac involvement (e.g. echo/MRI/EKG) is recommended for asymptomatic at-risk first-degree relatives. This is performed every one to three years after age ten years old, depending on the cardiologist's recommendations.    Because Ricardo's mother's genetic testing did not provide her with a genetic diagnosis, genetic testing for relatives is not recommended.  It is still important that relatives like Ricardo, his sister, and his mother's siblings received cardiac screening with a cardiologist because they are still at an increased risk of arrhythmogenic cardiomyopathy.  Ricardo has a visit planned in January.      Next 5 appointments (look out 90 days)      Dec 14, 2023 12:40 PM  (Arrive by 12:20 PM)  Provider Visit with CARI Montesinos CNP  [facility] ([facility]) [address] [facility] [address]  [facility] [id number] [phone]            Estefani Peterson [facility]  Genetic Counselor   [facility]   Phone: [phone]  Pager: [phone]            Approximate Time Spent in Consultation: 40 min         This note was written with the assistance of voice recognition software and may contain occasional typographic errors. Please contact our office if you identify errors requiring correction.    Virtual Visit Details    Type of service:  Video Visit     Originating Location (pt. Location): Home  {PROVIDER LOCATION On-site should be selected for visits conducted from your clinic location or adjoining [facility] hospital, academic office, or other nearby [facility] building. Off-site should be selected for all other provider locations, including home:[id number]}  Distant Location (provider location):  On-site  Platform used for Video  Visit: AmWell      Again, thank you for allowing me to participate in the care of your patient.      Sincerely,    Estefani Peterson GC

## 2023-12-13 NOTE — NURSING NOTE
Is the patient currently in the state of [place]? YES    Visit mode:VIDEO    If the visit is dropped, the patient can be reconnected by: VIDEO VISIT: Text to cell phone:   Telephone Information:   Mobile [phone]       Will anyone else be joining the visit? NO  (If patient encounters technical issues they should call [phone]    How would you like to obtain your AVS? MyChart    Are changes needed to the allergy or medication list? No    Reason for visit: Consult    MOOK KrishnamurthyF

## 2023-12-13 NOTE — PROGRESS NOTES
"Name:  Ricardo Richardson \"Doreen"  :   1980  MRN:   [id number]  Date of service: Dec 13, 2023  Primary Provider: Remy Dai  Referring Provider: Brianna José    PRESENTING INFORMATION   Reason for consultation:  A consultation in the [facility] was requested for Ricardo, a 43 year old male, for evaluation of The encounter diagnosis was Family history of arrhythmogenic left ventricular cardiomyopathy.     Ricardo was unaccompanied to this visit.    History is obtained from Patient and electronic health record. I met with Ricardo at his request to obtain a personal and family history, discuss possible genetic contributions to his symptoms, and to obtain informed consent for genetic testing if indicated.      ASSESSMENT & PLAN  Ricardo is a 43 year old-year old male with a family history of arrhythmogenic cardiomyopathy in his mother, Le Muir  1957, who recently passed away. She had genetic testing performed in May 2023 which identified an ANK2 variant of uncertain significance.     Ricardo's mother signed a consent to communicate form with her children. Results were reviewed with Ricardo today.     Arrhythmogenic cardiomyopathy is mainly characterized by fibro or fibrofatty myocardial replacement which can cause progressive global/regional ventricular dysfunction, and high burden of ventricular arrhythmias. Structural alterations can affect left, right, or both ventricles which lead to three recognized phenotypic variants: the dominant-right ( the classic arrhythmogenic right ventricular cardiomyopathy --ARVC) variant, the biventricular variant (Biv ACM), and the dominant-left variant (also known as  arrhythmogenic left ventricular cardiomyopathy --ALVC). Arrhythmogenic cardiomyopathy can be caused by variants in multiple genes. Most are inherited in an autosomal dominant fashion.  Genetic testing was performed for Ricardo's mother to look for these variants.  It did " not establish a genetic diagnosis.  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    ANK2 is a scaffolding protein for ion channels and transporters, as well as an interacting protein for structural and signaling proteins. Certain loss-of-function ANK2 variants are associated with cardiac conditions who exhibit autosomal-dominant inheritance with incomplete penetrance and variable expressivity. Features include a predisposition to supraventricular and ventricular arrhythmias, arrhythmogenic cardiomyopathy, congenital and adult-onset structural heart disease, and sudden death. It has been reported in families with LQTS, WPW, ARVC, DCM, Another independent group of ANK2 variants are associated with increased risk for distinct neurological phenotypes, including epilepsy and autism spectrum disorders. It is also being investigated as a metabolic phenotype due to it's role in regulation of fat and glucose metabolism.  There is little information about the variant identified in Ricardo's mother: It is a conserved amino acid.  In silica predictions are inconclusive, and the amino acid substitution is similar properties.  Because this variant is uncertain, does not provide a genetic diagnosis and cascade genetic testing for relatives is not recommended.  If other individuals in the family are later found to have cardiomyopathy/arrhythmia, we can revisit genetic testing.    Keep appointment with Dr. José  Contact information was provided should any questions arise in the future.       HPI:  Ricardo is a 43 year old-year old male with a family history of arrhythmogenic cardiomyopathy in his mother.  Ricardo's mother had left-dominant arrhythmogenic cardiomyopathy, LVEF 36% and RVEF 50%, with extensive scar. She was previously followed by Dr. José. Mother has chronic systolic HF (EF 15-20%), cardiac arrest 2/2 VT s/p ICD 1/2023,  "OLI, CKD3a. She was admitted in October for VT arrest s/p x1 defib. She unfortunately decompensated and was not a candidate for heart transplant/LVAD. She had genetic testing with [facility] in May 2023 which returned uncertain (a variant of uncertain significance was identified in ANK2). Additional history is significant for osteopenia, major depressive disorder, and type 1 diabetes.     Ricardo has not had any cardiac screening.  He has an appointment with Dr. José January 19, 2024. Echo ordered. Zio patch in process.  He is interested in being evaluated for arrhythmogenic cardiomyopathy.  His main concern is his son, who is 3 years old.    He denied chest pain, dizziness, palpitations, or syncope. He has anxiety that he \"carries in his chest\". He recently presented to the emergency department due to anxiety.  He was discharged with hydroxyzine. Follow-up with his primary care providers scheduled 12/14. Ricardo denies a history of developmental delays, intellectual disability, learning disability, vision loss, hearing loss, seizures, hypotonia, muscle weakness, birth defects, poor growth, skeletal concerns, hypermobility, joint dislocations, or other major health concerns. He has astigmatism. He had PE tubes as a child. He has eustachian tube dysfunction and allergies.  He is tall (MPH 72.5\") He is 6'4\".      Patient Active Problem List   Diagnosis   (none) - all problems resolved or deleted       Pertinent studies/abnormal test results:   No history of genetic testing    Imaging results:   No cardiac imaging  No results found for this or any previous visit (from the past 744 hour(s)).  No results found for any visits on 12/13/23.  No results found for this or any previous visit (from the past 8760 hour(s)).    Past Medical History:  Past Medical History:   Diagnosis Date    Essential hypertension, benign     Created by Conversion     Neoplasm of uncertain behavior of skin     Mole removed, benign Advised to " have regular routine skin checks        Past Surgical History:  Past Surgical History:   Procedure Laterality Date    MOLE REMOVAL      TONSILLECTOMY & ADENOIDECTOMY      as a child        FAMILY HISTORY  A three generation pedigree was obtained today and scanned into the EMR. The following information is significant:    Children  3-year-old son who is well.  He has a history of strabismus.  He has not had any cardiac imaging    Siblings  Full siblings: Sister, Nasreen, who is well.  She has a Zio patch pending.  An echo has been ordered.  Paternal half siblings: None  Maternal half siblings: None    Maternal Family  Mother, Le Muir  1957: Passed due to left dominant arrhythmogenic cardiomyopathy at 68.  She had an ICD in place.  She is found to have a variant of uncertain significance in ANK2.  She also had a history of hyperlipidemia, OLI, type 1 diabetes, and depression  Maternal grandfather: Had cardiomyopathy noted on death certificate.  Had a pacemaker in place.  Passed due to myelodysplastic syndrome at 90  Maternal grandmother: Passed in her eighties due to unspecified heart issues.  She had a heart monitor.  Maternal aunts/uncles: Well.  No cardiac screening  Maternal cousins: Well  Maternal ancestry: Deferred    Paternal Family  Father, Data Unavailable: Well  Paternal grandfather: No cardiomyopathy/heart issues  Paternal grandmother: No cardiomyopathy/heart issues  Paternal aunts/uncles: Deferred  Paternal cousins: Deferred  Paternal ancestry: Deferred    Family history is otherwise negative for cardiomyopathy, heart failure, arrhythmias, syncope/dizziness/palpitations, cardiac amyloidosis/fibrosis, SCD (especially <35y)/SIDS/ demise, skeletal muscle concerns such as myopathy, seizures, delays, intellectual disability, growth concerns, birth defects, and known genetic conditions. Consanguinity is denied.    DISCUSSION  Genetics  Today we reviewed that our genetic material or DNA is  responsible for how our bodies grow and develop. It can be thought of as an instruction manual. This instruction manual is made up of chapters called genes. Our genes are inherited on structures called chromosomes, of which we have 23 pairs for a total of 46. For each chromosome pair, one copy is inherited from the mother and one is inherited from the father. The chromosome pairs are numbered from 1 to 22, and the 23rd pair of chromsomes is called the sex chromsomes. These determine if we are a male or female.     Changes in the chromosomes or in the DNA sequence of a gene can cause the signs and symptoms of a genetic condition because the instructions it is providing to the body have been altered. This can be a small spelling error in the gene, a large duplicated piece of information, or a large missing piece of information.     Genetic Testing  Today we reviewed the potential results from genetic testing including a positive, negative, or variant of uncertain significance.     One possibility is a change(s) could be seen that is known to cause the gene to malfunction and is therefore consistent with a genetic diagnosis this is considered a positive result.  A positive result may provide more information on appropriate clinical management and allow for genetic testing and relatives  It is also possible that no change(s) are seen in the genes tested.  This is considered a negative result.  A negative result would not completely rule out a possible genetic cause  Not all changes in our genes cause disease.  Sometimes, it can be difficult for the laboratory to determine whether or not a change that is found contributes to the patient's symptoms.  If the meaning of a particular gene change is unknown, the lab classifies the result as a variant of unknown significance (VUS).  This does not provide a genetic diagnosis.  It is important to follow-up on these uncertain results over time.  As we learn more about the variant  of uncertain significance, we will do reclassified as either disease-causing (a genetic diagnosis) or benign (normal)    DISCUSSION  Genetics  Today we reviewed that our genetic material or DNA is responsible for how our bodies grow and develop. It can be thought of as an instruction manual. This instruction manual is made up of chapters called genes. Our genes are inherited on structures called chromosomes, of which we have 23 pairs for a total of 46. For each chromosome pair, one copy is inherited from the mother and one is inherited from the father. The chromosome pairs are numbered from 1 to 22, and the 23rd pair of chromsomes is called the sex chromsomes. These determine if we are a male or female.     Changes in the chromosomes or in the DNA sequence of a gene can cause the signs and symptoms of a genetic condition because the instructions it is providing to the body have been altered. This can be a small spelling error in the gene, a large duplicated piece of information, or a large missing piece of information.     Cardiomyopathy  There are various types of cardiomyopathies, which are disorders of the heart muscle. This reduces the heart's ability to provide oxygen-rich blood to the body, which may lead to an abnormal heart sound during a heartbeat (heart murmur) and other signs and symptoms of the condition. These include chest pain; shortness of breath, especially with physical exertion; a sensation of fluttering or pounding in the chest (palpitations); lightheadedness; dizziness; and fainting. Significant health risks exist including arrhythmias, increased risk of sudden death, or heart failure, which may require heart transplantation. Symptoms are variable, even within the same family.  It can develop at any time throughout life. Some individuals may not develop symptoms despite having the genetic cause for cardiomyopathy (reduced penetrance).     Arrhythmogenic Cardiomyopathy  Arrhythmogenic  cardiomyopathy is mainly characterized by fibro or fibrofatty replacement of the heart muscle (myocardium), which can cause progressive dysfunction of the lower chambers of the heart, called the ventricles, and arrhythmias. It generally begins in the second decade of life or later. Structural alterations can affect left, right, or both ventricles. There are different names for the condition depending on which ventricles are affected: dominant-right (arrhythmogenic right ventricular cardiomyopathy (ARVC)), dominant-left (arrhythmogenic left ventricular cardiomyopathy (ALVC), or biventricular (Biv ACM).  Management includes cardiac monitoring via echo, MRI, and EKG. Medications and surgery (e.g. ICD or heart transplants) are considered depending on a patient's status. Lifestyle modifications are made (frequent/intense physical exercise and intense competitive athletic activity is discouraged because of the strain caused on the right heart; diet; managing additional risk factors like high blood pressure).    Arrhythmogenic cardiomyopathy can be caused by variants in multiple genes incuding but not limited to PKP2, DSP, DSG2, DSC2, JUP, TMEM43, PLN, FLNC, SHERITA, LMNA. Most are inherited in an autosomal dominant fashion, meaning there is a 50% chance that a variant in one of these genes would be passed to a child. Identification of the specific genetic variant can guide the clinical decisions in some scenarios. Genetic testing can also be beneficial in identifying at risk relatives:    If a pathogenic variant is identified, cascade testing and relatives can be performed.  Individuals with a variant would need to be followed periodically by cardiology.  Individuals who do not have the variant do not need to be followed unless new concerns arise.   If genetic testing has not been performed or did not identify a pathogenic variant, screening for cardiac involvement (e.g. echo/MRI/EKG) is recommended for asymptomatic at-risk  first-degree relatives. This is performed every one to three years after age ten years old, depending on the cardiologist's recommendations.    Because Ricardo's mother's genetic testing did not provide her with a genetic diagnosis, genetic testing for relatives is not recommended.  It is still important that relatives like Ricardo, his sister, and his mother's siblings received cardiac screening with a cardiologist because they are still at an increased risk of arrhythmogenic cardiomyopathy.  Ricardo has a visit planned in January.      Next 5 appointments (look out 90 days)      Dec 14, 2023 12:40 PM  (Arrive by 12:20 PM)  Provider Visit with CARI Montesinos CNP  [facility] ([facility] [place]) [address] [id number] [phone]            Estefani Peterson [facility]  Genetic Counselor   [facility]   Phone: [phone]  Pager: [phone]            Approximate Time Spent in Consultation: 40 min         This note was written with the assistance of voice recognition software and may contain occasional typographic errors. Please contact our office if you identify errors requiring correction.

## 2023-12-13 NOTE — PROGRESS NOTES
Virtual Visit Details    Type of service:  Video Visit     Originating Location (pt. Location): Home    Distant Location (provider location):  On-site  Platform used for Video Visit: Odalis

## 2023-12-14 ENCOUNTER — LAB (OUTPATIENT)
Dept: FAMILY MEDICINE | Facility: CLINIC | Age: 43
End: 2023-12-14

## 2023-12-14 ENCOUNTER — OFFICE VISIT (OUTPATIENT)
Dept: FAMILY MEDICINE | Facility: CLINIC | Age: 43
End: 2023-12-14
Payer: COMMERCIAL

## 2023-12-14 VITALS
DIASTOLIC BLOOD PRESSURE: 73 MMHG | HEART RATE: 82 BPM | BODY MASS INDEX: 25.8 KG/M2 | SYSTOLIC BLOOD PRESSURE: 152 MMHG | HEIGHT: 76 IN | RESPIRATION RATE: 17 BRPM | TEMPERATURE: 99.7 F | WEIGHT: 211.9 LBS | OXYGEN SATURATION: 99 %

## 2023-12-14 DIAGNOSIS — F41.9 ANXIETY: Primary | ICD-10-CM

## 2023-12-14 DIAGNOSIS — F43.21 GRIEF: ICD-10-CM

## 2023-12-14 DIAGNOSIS — R03.0 ELEVATED BLOOD PRESSURE READING: ICD-10-CM

## 2023-12-14 DIAGNOSIS — Z12.11 SCREEN FOR COLON CANCER: ICD-10-CM

## 2023-12-14 DIAGNOSIS — U07.1 INFECTION DUE TO 2019 NOVEL CORONAVIRUS: ICD-10-CM

## 2023-12-14 PROCEDURE — 99214 OFFICE O/P EST MOD 30 MIN: CPT | Performed by: NURSE PRACTITIONER

## 2023-12-14 RX ORDER — SERTRALINE HYDROCHLORIDE 25 MG/1
25 TABLET, FILM COATED ORAL DAILY
Qty: 30 TABLET | Refills: 0 | Status: SHIPPED | OUTPATIENT
Start: 2023-12-14 | End: 2023-12-29

## 2023-12-14 RX ORDER — HYDROXYZINE HYDROCHLORIDE 25 MG/1
25 TABLET, FILM COATED ORAL 3 TIMES DAILY PRN
Qty: 60 TABLET | Refills: 3 | Status: SHIPPED | OUTPATIENT
Start: 2023-12-14 | End: 2024-09-30

## 2023-12-14 ASSESSMENT — ANXIETY QUESTIONNAIRES
6. BECOMING EASILY ANNOYED OR IRRITABLE: SEVERAL DAYS
7. FEELING AFRAID AS IF SOMETHING AWFUL MIGHT HAPPEN: MORE THAN HALF THE DAYS
1. FEELING NERVOUS, ANXIOUS, OR ON EDGE: NEARLY EVERY DAY
3. WORRYING TOO MUCH ABOUT DIFFERENT THINGS: MORE THAN HALF THE DAYS
GAD7 TOTAL SCORE: 10
GAD7 TOTAL SCORE: 10
IF YOU CHECKED OFF ANY PROBLEMS ON THIS QUESTIONNAIRE, HOW DIFFICULT HAVE THESE PROBLEMS MADE IT FOR YOU TO DO YOUR WORK, TAKE CARE OF THINGS AT HOME, OR GET ALONG WITH OTHER PEOPLE: NOT DIFFICULT AT ALL
4. TROUBLE RELAXING: SEVERAL DAYS
2. NOT BEING ABLE TO STOP OR CONTROL WORRYING: SEVERAL DAYS
5. BEING SO RESTLESS THAT IT IS HARD TO SIT STILL: NOT AT ALL

## 2023-12-14 ASSESSMENT — PAIN SCALES - GENERAL: PAINLEVEL: NO PAIN (0)

## 2023-12-14 ASSESSMENT — PATIENT HEALTH QUESTIONNAIRE - PHQ9
SUM OF ALL RESPONSES TO PHQ QUESTIONS 1-9: 5
SUM OF ALL RESPONSES TO PHQ QUESTIONS 1-9: 5
10. IF YOU CHECKED OFF ANY PROBLEMS, HOW DIFFICULT HAVE THESE PROBLEMS MADE IT FOR YOU TO DO YOUR WORK, TAKE CARE OF THINGS AT HOME, OR GET ALONG WITH OTHER PEOPLE: NOT DIFFICULT AT ALL

## 2023-12-14 NOTE — PROGRESS NOTES
Assessment and Plan:     Anxiety  Discussed treatment options.  Will start sertraline 25 mg daily.  Educated on its indications and side effects.  He continues hydroxyzine as needed.  Will have patient follow-up with Dr. Dai in 2 to 3 weeks.  - sertraline (ZOLOFT) 25 MG tablet  Dispense: 30 tablet; Refill: 0  - hydrOXYzine HCl (ATARAX) 25 MG tablet  Dispense: 60 tablet; Refill: 3    Grief  Patient requests referral to new grief counselor.  - Adult Mental Health  Referral    Infection due to 2019 novel coronavirus  Patient continues to recover from COVID-19.  He has no complications.    Elevated blood pressure reading  I encouraged him to monitor his blood pressure at home.  Suspect elevated blood pressure reading today is due to anxiety.  If blood pressure remains greater than 140/90, he is to follow-up.    Screen for colon cancer  - [employer])      Subjective:     Ricardo is a 43 year old male presenting to the clinic for concerns for anxiety.  Patient's mother passed away November 8 from arrhythmogenic cardiomyopathy.  She also had complications from type 1 diabetes.  Patient saw a genetic counselor and has an appointment with cardiology on 1/19/2023.  He has an echocardiogram and a Zio patch ordered.  Patient is consumed with racing thoughts and worries frequently about his health and his family.  He finds that he is clenching his jaw and experiences chest tightness when he is anxious.  He was seen at the urgency room on 11/29/2023 where he was prescribed hydroxyzine 25 mg tablets.  He is taking 1/2 tablet in the morning 1/4 tablet in the afternoon.  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  He denies thoughts of suicide.  He did see a grief counselor last night.  He was not impressed with the counselor.  Patient tested positive for COVID on 12/4/2023.  When he stayed home for 5 days with COVID, his anxiety improved.  Patient states his COVID  symptoms have improved, but he continues to experience some sinus congestion.    [employer]: A complete 14 point review of systems was obtained and is negative or as stated in the history of present illness.    Social History     Socioeconomic History    Marital status:      Spouse name: Not on file    Number of children: Not on file    Years of education: Not on file    Highest education level: Not on file   Occupational History    Not on file   Tobacco Use    Smoking status: Former     Passive exposure: Past    Smokeless tobacco: Former    Tobacco comments:     off/on, quit 2016; about 5 pack years   Vaping Use    Vaping Use: Never used   Substance and Sexual Activity    Alcohol use: Yes     Comment: Alcoholic Drinks/day: 1-2x/week    Drug use: No    Sexual activity: Yes     Partners: Female   Other Topics Concern    Not on file   Social History Narrative    Not on file     Social Determinants of Health     Financial Resource Strain: Low Risk  (12/14/2023)    Financial Resource Strain     Within the past 12 months, have you or your family members you live with been unable to get utilities (heat, electricity) when it was really needed?: No   Food Insecurity: Low Risk  (12/14/2023)    Food Insecurity     Within the past 12 months, did you worry that your food would run out before you got money to buy more?: No     Within the past 12 months, did the food you bought just not last and you didn t have money to get more?: No   Transportation Needs: Low Risk  (12/14/2023)    Transportation Needs     Within the past 12 months, has lack of transportation kept you from medical appointments, getting your medicines, non-medical meetings or appointments, work, or from getting things that you need?: No   Physical Activity: Not on file   Stress: Not on file   Social Connections: Not on file   Interpersonal Safety: Low Risk  (9/28/2023)    Interpersonal Safety     Do you feel physically and emotionally safe where you  "currently live?: Yes     Within the past 12 months, have you been hit, slapped, kicked or otherwise physically hurt by someone?: No     Within the past 12 months, have you been humiliated or emotionally abused in other ways by your partner or ex-partner?: No   Housing Stability: Low Risk  (12/14/2023)    Housing Stability     Do you have housing? : Yes     Are you worried about losing your housing?: No       Active Ambulatory Problems     Diagnosis Date Noted    No Active Ambulatory Problems     Resolved Ambulatory Problems     Diagnosis Date Noted    Skin Neoplasm Of Uncertain Behavior     Benign Essential Hypertension      No Additional Past Medical History       Family History   Problem Relation Age of Onset    Diabetes Mother     Diabetes Father     Basal cell carcinoma Father     No Known Problems Sister     Dementia Maternal Grandmother     Myelodysplastic syndrome Maternal Grandfather     Pacemaker Maternal Grandfather     Heart Disease Paternal Grandmother     Heart Disease Paternal Grandfather        Objective:     BP (!) 152/73   Pulse 82   Temp 99.7  F (37.6  C)   Resp 17   Ht 1.93 m (6' 4\")   Wt 96.1 kg (211 lb 14.4 oz)   SpO2 99%   BMI 25.79 kg/m      Patient is alert, in no obvious distress.   Skin: Warm, dry.  No lesions or rashes.  Skin turgor rapid return.   HEENT:  Head normocephalic, atraumatic.  Eyes normal. Ears normal.  Nose patent, mucosa pink.  Oropharynx mucosa pink.  No lesions or tonsillar enlargement.   Neck: Supple, no lymphadenopathy.   No thyromegaly.  Lungs:  Clear to auscultation. Respirations even and unlabored.  No wheezing or rales noted.   Heart:  Regular rate and rhythm.  No murmurs, S3, S4, gallops, or rubs.            Answers submitted by the patient for this visit:  Patient Health Questionnaire (Submitted on 12/14/2023)  If you checked off any problems, how difficult have these problems made it for you to do your work, take care of things at home, or get along with " other people?: Not difficult at all  PHQ9 TOTAL SCORE: 5  NELA-7 (Submitted on 12/14/2023)  NELA 7 TOTAL SCORE: 10  Depression / Anxiety Questionnaire (Submitted on 12/14/2023)  Chief Complaint: Chronic problems general questions HPI Form  Depression/Anxiety: Anxiety  Anxiety only (Submitted on 12/14/2023)  Chief Complaint: Chronic problems general questions HPI Form  Anxiety since last: : better  Other associated symotome: : Yes  Significant life event: : grief or loss, health concerns  Anxious:: Yes  Current substance use:: No  General Questionnaire (Submitted on 12/14/2023)  Chief Complaint: Chronic problems general questions HPI Form  How many servings of fruits and vegetables do you eat daily?: 4 or more  On average, how many sweetened beverages do you drink each day (Examples: soda, juice, sweet tea, etc.  Do NOT count diet or artificially sweetened beverages)?: 1  How many minutes a day do you exercise enough to make your heart beat faster?: 9 or less  How many days a week do you exercise enough to make your heart beat faster?: 3 or less  How many days per week do you miss taking your medication?: 0

## 2023-12-22 PROCEDURE — 93248 EXT ECG>7D<15D REV&INTERPJ: CPT | Performed by: INTERNAL MEDICINE

## 2023-12-28 NOTE — PROGRESS NOTES
Ricardo is a 43 year old who is being evaluated via a billable telephone visit.      What phone number would you like to be contacted at? [phone]   How would you like to obtain your AVS? Parag    Distant Location (provider location):  On-site    Assessment & Plan     Ricardo was seen today for recheck medication.    Diagnoses and all orders for this visit:    Anxiety  -     sertraline (ZOLOFT) 50 MG tablet; Take 1 tablet (50 mg) by mouth daily    Grief  -     sertraline (ZOLOFT) 50 MG tablet; Take 1 tablet (50 mg) by mouth daily         Increase sertraline to 50 mg.  Continue as needed use of hydroxyzine.  Continue cognitive behavioral therapy.  Follow-up in 2 months.               Remy Dai MD, MD  [facility]   Ricardo is a 43 year old, presenting for the following health issues:  No chief complaint on file.      HPI       The presents today's visit is to discuss ongoing management of anxiety.  His mother  from Arrhythmogenic cardiomyopathy.  He is in the process of undergoing a workup through genetic counseling and cardiology.  He has significant anxiety stemming out of this stress.  He has a prior history of an adjustment disorder for which she was treated with medication in the past.  His chart is reviewed.  On 2023 he was started on sertraline to accompanying hydroxyzine which had been started at the prior emergency department visit on 2023.  He reports that his anxiety is improving.  He is becoming established with a therapist although has not really started into therapy itself yet.  He plans to continue this avenue of treatment.  He is still taking hydroxyzine at relatively low doses but taking on most days.  The sertraline seems to have helped overall improve his mood and lessen anxiety.  He did report some loose stools at the onset of treatment but this has improved.    Today we discussed increasing the dose of sertraline continuing  to utilize the hydroxyzine as needed and progressing with therapy.      Review of Systems   Complete review of systems is obtained.  Other than the specific considerations noted above complete review of systems is negative.        Objective           Vitals:  No vitals were obtained today due to virtual visit.    Physical Exam   healthy, alert, and no distress  PSYCH: Alert and oriented times 3; coherent speech, normal   rate and volume, able to articulate logical thoughts, able   to abstract reason, no tangential thoughts, no hallucinations   or delusions  His affect is normal  RESP: No cough, no audible wheezing, able to talk in full sentences  Remainder of exam unable to be completed due to telephone visits                Phone call duration: 15 minutes

## 2023-12-29 ENCOUNTER — VIRTUAL VISIT (OUTPATIENT)
Dept: FAMILY MEDICINE | Facility: CLINIC | Age: 43
End: 2023-12-29
Payer: COMMERCIAL

## 2023-12-29 DIAGNOSIS — F41.9 ANXIETY: Primary | ICD-10-CM

## 2023-12-29 DIAGNOSIS — F43.21 GRIEF: ICD-10-CM

## 2023-12-29 PROCEDURE — 99214 OFFICE O/P EST MOD 30 MIN: CPT | Mod: 93 | Performed by: FAMILY MEDICINE

## 2023-12-29 PROCEDURE — 96127 BRIEF EMOTIONAL/BEHAV ASSMT: CPT | Performed by: FAMILY MEDICINE

## 2023-12-29 ASSESSMENT — ANXIETY QUESTIONNAIRES
7. FEELING AFRAID AS IF SOMETHING AWFUL MIGHT HAPPEN: SEVERAL DAYS
IF YOU CHECKED OFF ANY PROBLEMS ON THIS QUESTIONNAIRE, HOW DIFFICULT HAVE THESE PROBLEMS MADE IT FOR YOU TO DO YOUR WORK, TAKE CARE OF THINGS AT HOME, OR GET ALONG WITH OTHER PEOPLE: NOT DIFFICULT AT ALL
3. WORRYING TOO MUCH ABOUT DIFFERENT THINGS: SEVERAL DAYS
4. TROUBLE RELAXING: NOT AT ALL
5. BEING SO RESTLESS THAT IT IS HARD TO SIT STILL: NOT AT ALL
GAD7 TOTAL SCORE: 3
GAD7 TOTAL SCORE: 3
6. BECOMING EASILY ANNOYED OR IRRITABLE: NOT AT ALL
1. FEELING NERVOUS, ANXIOUS, OR ON EDGE: SEVERAL DAYS
2. NOT BEING ABLE TO STOP OR CONTROL WORRYING: NOT AT ALL

## 2023-12-29 ASSESSMENT — PATIENT HEALTH QUESTIONNAIRE - PHQ9
SUM OF ALL RESPONSES TO PHQ QUESTIONS 1-9: 3
10. IF YOU CHECKED OFF ANY PROBLEMS, HOW DIFFICULT HAVE THESE PROBLEMS MADE IT FOR YOU TO DO YOUR WORK, TAKE CARE OF THINGS AT HOME, OR GET ALONG WITH OTHER PEOPLE: NOT DIFFICULT AT ALL
SUM OF ALL RESPONSES TO PHQ QUESTIONS 1-9: 3

## 2024-01-19 ENCOUNTER — OFFICE VISIT (OUTPATIENT)
Dept: CARDIOLOGY | Facility: CLINIC | Age: 44
End: 2024-01-19
Attending: STUDENT IN AN ORGANIZED HEALTH CARE EDUCATION/TRAINING PROGRAM
Payer: COMMERCIAL

## 2024-01-19 VITALS
DIASTOLIC BLOOD PRESSURE: 83 MMHG | WEIGHT: 216.7 LBS | SYSTOLIC BLOOD PRESSURE: 133 MMHG | HEART RATE: 70 BPM | BODY MASS INDEX: 26.38 KG/M2 | OXYGEN SATURATION: 99 %

## 2024-01-19 DIAGNOSIS — Z82.49 FAMILY HISTORY OF ARRHYTHMOGENIC LEFT VENTRICULAR CARDIOMYOPATHY: ICD-10-CM

## 2024-01-19 DIAGNOSIS — R00.2 PALPITATIONS: ICD-10-CM

## 2024-01-19 DIAGNOSIS — I47.29 NSVT (NONSUSTAINED VENTRICULAR TACHYCARDIA) (H): ICD-10-CM

## 2024-01-19 DIAGNOSIS — R03.0 ELEVATED BLOOD PRESSURE READING WITHOUT DIAGNOSIS OF HYPERTENSION: ICD-10-CM

## 2024-01-19 DIAGNOSIS — Z82.49 FAMILY HISTORY OF ARRHYTHMOGENIC LEFT VENTRICULAR CARDIOMYOPATHY: Primary | ICD-10-CM

## 2024-01-19 LAB — LVEF ECHO: NORMAL

## 2024-01-19 PROCEDURE — 99204 OFFICE O/P NEW MOD 45 MIN: CPT | Mod: 25 | Performed by: STUDENT IN AN ORGANIZED HEALTH CARE EDUCATION/TRAINING PROGRAM

## 2024-01-19 PROCEDURE — 93005 ELECTROCARDIOGRAM TRACING: CPT

## 2024-01-19 PROCEDURE — 99213 OFFICE O/P EST LOW 20 MIN: CPT | Performed by: STUDENT IN AN ORGANIZED HEALTH CARE EDUCATION/TRAINING PROGRAM

## 2024-01-19 PROCEDURE — 93010 ELECTROCARDIOGRAM REPORT: CPT | Performed by: INTERNAL MEDICINE

## 2024-01-19 PROCEDURE — 93306 TTE W/DOPPLER COMPLETE: CPT | Performed by: INTERNAL MEDICINE

## 2024-01-19 RX ORDER — METOPROLOL SUCCINATE 25 MG/1
25 TABLET, EXTENDED RELEASE ORAL DAILY
Qty: 90 TABLET | Refills: 3 | Status: SHIPPED | OUTPATIENT
Start: 2024-01-19 | End: 2024-01-31

## 2024-01-19 ASSESSMENT — PAIN SCALES - GENERAL: PAINLEVEL: NO PAIN (0)

## 2024-01-19 NOTE — NURSING NOTE
Chief Complaint   Patient presents with    New Patient      43 year old male with family history of ALVC presenting for evaluation.       Vitals were taken, medications reconciled, and EKG was performed.    Tamy Barajas CNA  11:11 AM

## 2024-01-19 NOTE — PATIENT INSTRUCTIONS
You were seen today in the Adult Congenital and Cardiovascular Genetics Clinic at the [facility].    Cardiology Providers you saw during your visit:  Brianna José MD    Diagnosis:  ALVC    Results:  Brianna José MD reviewed the results of your Echocardiogram, EKG, and Ziopatch testing today in clinic.    Recommendations for you:    Start metoprolol succinate 25 mg once daily  Complete a Cardiac MRI at your earliest convenience. Dr. José will review the result and follow up with after    Pre-procedure instructions - Cardiac MRI with Contrast, Stress, and Flow  Patient Education     Location is [address]    How do I prepare for my exam? (Food and drink instructions)  Stop all caffeine 12 hours before the test. This includes coffee, tea, soda, chocolate and certain medicines (such as Anacin, Excedrin and NoDoz). Also avoid decaf coffee and tea, as these contain small amounts of caffeine.  You may drink water and take your morning medicines.  (Other Instructions)  You may need to stop some medicines before the test. Follow your doctor's orders.  You will need to arrive 1 hour early if you will be taking an anxiety medication for the test.        What Should I wear: The MRI machine uses a strong magnet. Due to increased risk of metallic materials/threading in clothing, for your safety, you will be requested to change into a hospital gown. Please remove any body piercings and hair or magnetic eyelash extensions before you arrive. You will also remove watches, jewelry, hairpins, wallets, dentures, partial dental plates and hearing aids. You may wear contact lenses, and you may be able to wear your rings. We have a safe place to keep your personal items, but it is safer to leave them at home.     How long does the Exam Take: Most tests take up to 90 minutes.       What Should I Bring: If you  have any implants please bring a card or surgical report with the implant information.  We may be unable to scan you if we do not have this information. Bring the results of similar scans you may have had previously. If you are a minor (under age 18) you will need to bring a parent or legal.     Do I need a : No     What Should I do after the Exam:  No restrictions, you may resume normal activities.     What is this test:  MRI (magnetic resonance imaging) uses a strong magnet and radio waves to look inside the body. An MRA (magnetic resonance angiogram) does the same thing, but it lets us look at your blood vessels. A computer turns the radio waves into pictures showing cross sections of the body, much like slices of bread. This helps us see any problems more clearly. You may receive fluid (called  contrast ) before or during your scan. The fluid helps us see the pictures better. We give the fluid through an IV (small needle in your arm).       General Cardiac Recommendations:  Continue to eat a heart healthy, low salt diet.  Continue to get 20-30 minutes of aerobic activity, 4-5 days per week.  Examples of aerobic activity include walking, running, swimming, cycling, etc.  Continue to observe good oral hygiene, with regular dental visits.      SBE prophylaxis:   Yes____  No_x___    Exercise restrictions:   Yes__X__  No____         If yes, list restrictions:  Must be allowed to rest if fatigued or SOB      FASTING CHOLESTEROL was checked in the last 5 years YES__x__  NO____ (2023)  If no, please follow up with your primary care physician. You should have a cholesterol screening every 5 years.      Follow-up:  1 year follow up with 14 day Ziopatch prior     If you have questions or concerns please contact us at:    Jayro Menendez RN, BSN     Rosalia Spain (Scheduling)  Nurse Care Coordinator     Clinic   CV Genetics      Adult Congenital and CV Genetic  [facility]  [facility]   [facility]  (P) [phone]     (P) [phone]  (F) [phone]     (F) [phone]      For after hours urgent needs, call [phone] and ask to speak to the Adult Congenital Physician on call.  Mention Job Code 0401.    For emergencies call 911.    [facility]  [facility]  Mail Code [id number]  [address] [id number]

## 2024-01-19 NOTE — PROGRESS NOTES
Cardiovascular Genetics Clinic Note    HPI  Dear colleagues,     I had the pleasure of seeing Mr. Ricardo Richardson in the Cardiology clinic.  As you know, Mr. Ricardo Richardson is a pleasant 43 year old male with a past medical history of anxiety, elevated BP without the diagnosis of HTN, and family history of arrhythmogenic cardiomyopathy who presents for further evaluation given his family history.    Patient reports overall he feels well. He used to have episodes of palpitations with anxiety, but now that his anxiety is being treated, these do not happen. He also reports previously having some elevations in BP, but with changes to diet and exercise, this has improved as well. Patient denies presyncope, syncope, chest pain, MATOS, SOB, orthopnea, PND, abdominal pain, nausea, emesis, LE edema, or weight change. His mother who had arrhythmogenic cardiomyopathy  recently. She had undergone genetic testing, but did not have any genes known linked to arrhythmogenic cardiomyopathy, so genetic testing has not been recommended for her family members, but cardiac screening was recommended.    ROS:  A complete 12-point ROS was negative except as above.    PAST MEDICAL HISTORY:  Patient Active Problem List   Diagnosis   (none) - all problems resolved or deleted        FAMILY HISTORY:  Family History   Problem Relation Age of Onset    Diabetes Mother     Diabetes Father     Basal cell carcinoma Father     No Known Problems Sister     Dementia Maternal Grandmother     Myelodysplastic syndrome Maternal Grandfather     Pacemaker Maternal Grandfather     Heart Disease Paternal Grandmother     Heart Disease Paternal Grandfather    Mother with history of arrhythmogenic cardiomyopathy, she passed from complications of this in .    SOCIAL HISTORY:  Patient is  with a young son.   Social History     Tobacco Use    Smoking status: Former     Passive exposure: Past    Smokeless tobacco: Former    Tobacco comments:      off/on, quit 2016; about 5 pack years   Vaping Use    Vaping Use: Never used   Substance Use Topics    Alcohol use: Yes     Comment: Alcoholic Drinks/day: 1-2x/week    Drug use: No        ALLERGIES:  Allergies   Allergen Reactions    Seasonal Allergies        CURRENT MEDICATIONS:  Current Outpatient Medications   Medication Sig Dispense Refill    hydrOXYzine HCl (ATARAX) 25 MG tablet Take 1 tablet (25 mg) by mouth 3 times daily as needed for anxiety 60 tablet 3    metoprolol succinate ER (TOPROL XL) 25 MG 24 hr tablet Take 1 tablet (25 mg) by mouth daily 90 tablet 3    sertraline (ZOLOFT) 50 MG tablet Take 1 tablet (50 mg) by mouth daily 30 tablet 4       EXAM:  /83 (BP Location: Left arm, Patient Position: Sitting, Cuff Size: Adult Regular)   Pulse 70   Wt 98.3 kg (216 lb 11.2 oz)   SpO2 99%   BMI 26.38 kg/m      General: appears comfortable, alert and interactive, in no acute distress  Head: normocephalic, atraumatic  Eyes: anicteric sclera, EOMI  Mouth: MMM  Neck: supple, no cervical adenopathy  CV: regular rate and rhythm, no murmur, gallop, rub, estimated JVP ~<6 cm  Resp: clear, no rales or wheezing  GI: soft, nontender, nondistended  Extremities: warm, no peripheral edema, 2+ bilateral radial pulses  Neurological: alert and oriented, no focal deficits  Psych: normal mood and affect  Derm: no rashes on exposed surfaces    Weight  Wt Readings from Last 10 Encounters:   01/19/24 98.3 kg (216 lb 11.2 oz)   12/14/23 96.1 kg (211 lb 14.4 oz)   12/13/23 94.6 kg (208 lb 8 oz)   09/28/23 102.1 kg (225 lb)   09/27/22 104.9 kg (231 lb 4.8 oz)   08/26/21 103 kg (227 lb)   09/28/20 104.3 kg (230 lb)   11/21/18 100.2 kg (221 lb)   10/26/18 98.4 kg (217 lb)   09/07/18 100.7 kg (222 lb)       I personally reviewed recent labs and data as below and discussed the results with the patient in clinic today.  Labs:  CBC RESULTS:  Lab Results   Component Value Date    WBC 5.9 09/28/2023    RBC 5.10 09/28/2023    HGB 14.7  09/28/2023    HCT 44.3 09/28/2023    MCV 87 09/28/2023    MCH 28.8 09/28/2023    MCHC 33.2 09/28/2023    RDW 13.1 09/28/2023     09/28/2023       CMP RESULTS:  Lab Results   Component Value Date     09/28/2023    POTASSIUM 4.3 09/28/2023    POTASSIUM 4.1 08/26/2021    CHLORIDE 106 09/28/2023    CHLORIDE 108 (H) 08/26/2021    CO2 25 09/28/2023    CO2 24 08/26/2021    ANIONGAP 11 09/28/2023    ANIONGAP 11 08/26/2021    GLC 94 09/28/2023    GLC 94 08/26/2021    BUN 18.7 09/28/2023    BUN 19 08/26/2021    CR 0.86 09/28/2023    GFRESTIMATED >90 09/28/2023    ANUJ 9.4 09/28/2023    BILITOTAL 0.3 09/28/2023    ALBUMIN 4.8 09/28/2023    ALBUMIN 4.2 08/26/2021    ALKPHOS 70 09/28/2023    ALT 22 09/28/2023    AST 25 09/28/2023        Recent Labs   Lab Test 09/28/23  1442 09/27/22  1139   CHOL 178 182   HDL 41 39*   * 121*   TRIG 80 112        Testing/Procedures:  I personally visualized and interpreted:  Cardiac Monitor 12/22/23  One episode of 14-beats of NSVT, asymptomatic and otherwise no remarkable findings    Echocardiogram 1/19/24  Interpretation Summary  Global and regional left ventricular function is normal with an EF of 60-65%.  Global right ventricular function is normal.  No significant valvular abnormalities present.  Estimated mean right atrial pressure is normal.  No pericardial effusion is present.    EKG 1/19/24 shows normal sinus rhythm with no acute ST-T changes    Outside results of note:  Outside records from [facility] were obtained, and relevant results/notes have been incorporated into HPI.    Assessment and Plan:     In summary, 43 year old male with a past medical history of anxiety, elevated BP without the diagnosis of HTN, and family history of arrhythmogenic cardiomyopathy who presents for further evaluation given his family history.    # Family Hx of Gene-Negative Arrhythmogenic Cardiomyopathy  # Hx of Palpitations with Anxiety  # 1 Episode of NSVT on recent monitor  #  Elevated BP without HTN Diagnosis  Discussed with patient about his normal echo and ECG today, but in the setting of his family history of one episode of NSVT on recent cardiac monitor, would recommend further eval with cMRI and starting beta blocker. Discussed with patient about limitation of stimulants including ETOH, safe recommendations for exercise, and heart healthy diet. Treat any episode of syncope as an emergency and seek immediate medical evaluation.   - Ordered cMRI with contrast  - Start metoprolol XL 25 mg daily  - Continue treatment of anxiety with PCP    To Do:  - Start metoprolol XL 25 mg daily  - Ordered cMRI with contrast  - Follow up timing pending results of cMRI, at latest one year, but if unexpected findings, will follow up sooner  - Will repeat cardiac monitor in one year    The patient states understanding and is agreeable with plan.   Feel free to contact myself regarding questions or concerns. It was a pleasure to see this patient today.    A total of 45 minutes was spent on the day of the visit, which includes preparation for the visit (reviewing previous medical records, laboratories and investigations), in conjunction with the actual clinic visit with the patient, which includes obtaining a history and physical exam, creating and reviewing the care plan, patient education (and family if present), counseling, documenting clinical information in the electronic health record and care coordination.     Brianna José MD   of Medicine, [facility]  Advanced Heart Failure and Transplant Cardiology

## 2024-01-19 NOTE — LETTER
2024      RE: Ricardo Richardson  [address] [place] [address]       Dear Colleague,    Thank you for the opportunity to participate in the care of your patient, Ricardo Richardson, at the [facility] [place] at [facility]. Please see a copy of my visit note below.    Cardiovascular Genetics Clinic Note    HPI  Dear colleagues,     I had the pleasure of seeing Mr. Ricardo Richardson in the Cardiology clinic.  As you know, Mr. Ricardo Richardson is a pleasant 43 year old male with a past medical history of anxiety, elevated BP without the diagnosis of HTN, and family history of arrhythmogenic cardiomyopathy who presents for further evaluation given his family history.    Patient reports overall he feels well. He used to have episodes of palpitations with anxiety, but now that his anxiety is being treated, these do not happen. He also reports previously having some elevations in BP, but with changes to diet and exercise, this has improved as well. Patient denies presyncope, syncope, chest pain, MATOS, SOB, orthopnea, PND, abdominal pain, nausea, emesis, LE edema, or weight change. His mother who had arrhythmogenic cardiomyopathy  recently. She had undergone genetic testing, but did not have any genes known linked to arrhythmogenic cardiomyopathy, so genetic testing has not been recommended for her family members, but cardiac screening was recommended.    ROS:  A complete 12-point ROS was negative except as above.    PAST MEDICAL HISTORY:  Patient Active Problem List   Diagnosis   (none) - all problems resolved or deleted        FAMILY HISTORY:  Family History   Problem Relation Age of Onset    Diabetes Mother     Diabetes Father     Basal cell carcinoma Father     No Known Problems Sister     Dementia Maternal Grandmother     Myelodysplastic syndrome Maternal Grandfather     Pacemaker Maternal Grandfather     Heart Disease Paternal  Grandmother     Heart Disease Paternal Grandfather    Mother with history of arrhythmogenic cardiomyopathy, she passed from complications of this in 2023.    SOCIAL HISTORY:  Patient is  with a young son.   Social History     Tobacco Use    Smoking status: Former     Passive exposure: Past    Smokeless tobacco: Former    Tobacco comments:     off/on, quit 2016; about 5 pack years   Vaping Use    Vaping Use: Never used   Substance Use Topics    Alcohol use: Yes     Comment: Alcoholic Drinks/day: 1-2x/week    Drug use: No        ALLERGIES:  Allergies   Allergen Reactions    Seasonal Allergies        CURRENT MEDICATIONS:  Current Outpatient Medications   Medication Sig Dispense Refill    hydrOXYzine HCl (ATARAX) 25 MG tablet Take 1 tablet (25 mg) by mouth 3 times daily as needed for anxiety 60 tablet 3    metoprolol succinate ER (TOPROL XL) 25 MG 24 hr tablet Take 1 tablet (25 mg) by mouth daily 90 tablet 3    sertraline (ZOLOFT) 50 MG tablet Take 1 tablet (50 mg) by mouth daily 30 tablet 4       EXAM:  /83 (BP Location: Left arm, Patient Position: Sitting, Cuff Size: Adult Regular)   Pulse 70   Wt 98.3 kg (216 lb 11.2 oz)   SpO2 99%   BMI 26.38 kg/m      General: appears comfortable, alert and interactive, in no acute distress  Head: normocephalic, atraumatic  Eyes: anicteric sclera, EOMI  Mouth: MMM  Neck: supple, no cervical adenopathy  CV: regular rate and rhythm, no murmur, gallop, rub, estimated JVP ~<6 cm  Resp: clear, no rales or wheezing  GI: soft, nontender, nondistended  Extremities: warm, no peripheral edema, 2+ bilateral radial pulses  Neurological: alert and oriented, no focal deficits  Psych: normal mood and affect  Derm: no rashes on exposed surfaces    Weight  Wt Readings from Last 10 Encounters:   01/19/24 98.3 kg (216 lb 11.2 oz)   12/14/23 96.1 kg (211 lb 14.4 oz)   12/13/23 94.6 kg (208 lb 8 oz)   09/28/23 102.1 kg (225 lb)   09/27/22 104.9 kg (231 lb 4.8 oz)   08/26/21 103 kg (227  lb)   09/28/20 104.3 kg (230 lb)   11/21/18 100.2 kg (221 lb)   10/26/18 98.4 kg (217 lb)   09/07/18 100.7 kg (222 lb)       I personally reviewed recent labs and data as below and discussed the results with the patient in clinic today.  Labs:  CBC RESULTS:  Lab Results   Component Value Date    WBC 5.9 09/28/2023    RBC 5.10 09/28/2023    HGB 14.7 09/28/2023    HCT 44.3 09/28/2023    MCV 87 09/28/2023    MCH 28.8 09/28/2023    MCHC 33.2 09/28/2023    RDW 13.1 09/28/2023     09/28/2023       CMP RESULTS:  Lab Results   Component Value Date     09/28/2023    POTASSIUM 4.3 09/28/2023    POTASSIUM 4.1 08/26/2021    CHLORIDE 106 09/28/2023    CHLORIDE 108 (H) 08/26/2021    CO2 25 09/28/2023    CO2 24 08/26/2021    ANIONGAP 11 09/28/2023    ANIONGAP 11 08/26/2021    GLC 94 09/28/2023    GLC 94 08/26/2021    BUN 18.7 09/28/2023    BUN 19 08/26/2021    CR 0.86 09/28/2023    GFRESTIMATED >90 09/28/2023    ANUJ 9.4 09/28/2023    BILITOTAL 0.3 09/28/2023    ALBUMIN 4.8 09/28/2023    ALBUMIN 4.2 08/26/2021    ALKPHOS 70 09/28/2023    ALT 22 09/28/2023    AST 25 09/28/2023        Recent Labs   Lab Test 09/28/23  1442 09/27/22  1139   CHOL 178 182   HDL 41 39*   * 121*   TRIG 80 112        Testing/Procedures:  I personally visualized and interpreted:  Cardiac Monitor 12/22/23  One episode of 14-beats of NSVT, asymptomatic and otherwise no remarkable findings    Echocardiogram 1/19/24  Interpretation Summary  Global and regional left ventricular function is normal with an EF of 60-65%.  Global right ventricular function is normal.  No significant valvular abnormalities present.  Estimated mean right atrial pressure is normal.  No pericardial effusion is present.    EKG 1/19/24 shows normal sinus rhythm with no acute ST-T changes    Outside results of note:  Outside records from [facility] were obtained, and relevant results/notes have been incorporated into HPI.    Assessment and Plan:     In summary, 43  year old male with a past medical history of anxiety, elevated BP without the diagnosis of HTN, and family history of arrhythmogenic cardiomyopathy who presents for further evaluation given his family history.    # Family Hx of Gene-Negative Arrhythmogenic Cardiomyopathy  # Hx of Palpitations with Anxiety  # 1 Episode of NSVT on recent monitor  # Elevated BP without HTN Diagnosis  Discussed with patient about his normal echo and ECG today, but in the setting of his family history of one episode of NSVT on recent cardiac monitor, would recommend further eval with cMRI and starting beta blocker. Discussed with patient about limitation of stimulants including ETOH, safe recommendations for exercise, and heart healthy diet. Treat any episode of syncope as an emergency and seek immediate medical evaluation.   - Ordered cMRI with contrast  - Start metoprolol XL 25 mg daily  - Continue treatment of anxiety with PCP    To Do:  - Start metoprolol XL 25 mg daily  - Ordered cMRI with contrast  - Follow up timing pending results of cMRI, at latest one year, but if unexpected findings, will follow up sooner  - Will repeat cardiac monitor in one year    The patient states understanding and is agreeable with plan.   Feel free to contact myself regarding questions or concerns. It was a pleasure to see this patient today.    A total of 45 minutes was spent on the day of the visit, which includes preparation for the visit (reviewing previous medical records, laboratories and investigations), in conjunction with the actual clinic visit with the patient, which includes obtaining a history and physical exam, creating and reviewing the care plan, patient education (and family if present), counseling, documenting clinical information in the electronic health record and care coordination.     Brianna José MD   of Medicine, [facility]  Advanced Heart Failure and Transplant Cardiology

## 2024-01-19 NOTE — NURSING NOTE
Cardiac Testing: Patient given instructions regarding Cardiac MRI. Discussed purpose, preparation, procedure and when to expect results reported back to the patient. Patient demonstrated understanding of this information and agreed to call with further questions or concerns.    Diet: Patient instructed regarding a heart healthy diet, including discussion of reduced fat and sodium intake. Patient demonstrated understanding of this information and agreed to call with further questions or concerns.    New Medication: Patient was educated regarding newly prescribed medication, including discussion of  the indication, administration, side effects, and when to report to MD or RN. Patient demonstrated understanding of this information and agreed to call with further questions or concerns. Started metoprolol succinate 25 mg once a day    Return Appointment: Patient given instructions regarding scheduling next clinic visit. Patient demonstrated understanding of this information and agreed to call with further questions or concerns. Follow up in 1 year with 14 day Codie delgado    Patient stated he understood all health information given and agreed to call with further questions or concerns.     Jayro Menendez RN

## 2024-01-22 ENCOUNTER — TELEPHONE (OUTPATIENT)
Dept: CARDIOLOGY | Facility: CLINIC | Age: 44
End: 2024-01-22
Payer: COMMERCIAL

## 2024-01-22 LAB
ATRIAL RATE - MUSE: 67 BPM
DIASTOLIC BLOOD PRESSURE - MUSE: NORMAL MMHG
INTERPRETATION ECG - MUSE: NORMAL
P AXIS - MUSE: 69 DEGREES
PR INTERVAL - MUSE: 184 MS
QRS DURATION - MUSE: 92 MS
QT - MUSE: 398 MS
QTC - MUSE: 420 MS
R AXIS - MUSE: 65 DEGREES
SYSTOLIC BLOOD PRESSURE - MUSE: NORMAL MMHG
T AXIS - MUSE: 59 DEGREES
VENTRICULAR RATE- MUSE: 67 BPM

## 2024-01-22 NOTE — TELEPHONE ENCOUNTER
Pt calling in regards to getting a hold of Dr José team in regards to question about recently prescribed med. Pt stated he is having some side effects ans would like to speak to someone. Please return call to pt.

## 2024-01-31 ENCOUNTER — MYC MEDICAL ADVICE (OUTPATIENT)
Dept: CARDIOLOGY | Facility: CLINIC | Age: 44
End: 2024-01-31
Payer: COMMERCIAL

## 2024-01-31 DIAGNOSIS — Z82.49 FAMILY HISTORY OF ARRHYTHMOGENIC LEFT VENTRICULAR CARDIOMYOPATHY: ICD-10-CM

## 2024-01-31 RX ORDER — METOPROLOL SUCCINATE 25 MG/1
12.5 TABLET, EXTENDED RELEASE ORAL DAILY
Qty: 45 TABLET | Refills: 3 | Status: SHIPPED | OUTPATIENT
Start: 2024-01-31

## 2024-01-31 NOTE — TELEPHONE ENCOUNTER
Date: 1/31/2024    Time of Call: 12:39 PM     Diagnosis:  ALVC     [ TORB ] Ordering provider: Dr. José  Order: I think current dose is good for him now. If BPs increase in the future >135/85 or he has more palpitations, we could then increase to 25 mg daily.      Order received by: Jayro Menendez RN    Keeping dose of Toprol XL to 12.5 mg once daily  Sent patient mychart response.

## 2024-02-19 DIAGNOSIS — Z82.49 FAMILY HISTORY OF ARRHYTHMOGENIC LEFT VENTRICULAR CARDIOMYOPATHY: Primary | ICD-10-CM

## 2024-03-05 LAB — NONINV COLON CA DNA+OCC BLD SCRN STL QL: NEGATIVE

## 2024-03-08 ENCOUNTER — HOSPITAL ENCOUNTER (OUTPATIENT)
Dept: MRI IMAGING | Facility: CLINIC | Age: 44
Discharge: HOME | End: 2024-03-08
Attending: STUDENT IN AN ORGANIZED HEALTH CARE EDUCATION/TRAINING PROGRAM | Admitting: STUDENT IN AN ORGANIZED HEALTH CARE EDUCATION/TRAINING PROGRAM
Payer: COMMERCIAL

## 2024-03-08 PROCEDURE — 255N000002 HC RX 255 OP 636: Performed by: STUDENT IN AN ORGANIZED HEALTH CARE EDUCATION/TRAINING PROGRAM

## 2024-03-08 PROCEDURE — A9585 GADOBUTROL INJECTION: HCPCS | Performed by: STUDENT IN AN ORGANIZED HEALTH CARE EDUCATION/TRAINING PROGRAM

## 2024-03-08 PROCEDURE — 75561 CARDIAC MRI FOR MORPH W/DYE: CPT | Mod: 26 | Performed by: INTERNAL MEDICINE

## 2024-03-08 PROCEDURE — 75561 CARDIAC MRI FOR MORPH W/DYE: CPT

## 2024-03-08 RX ORDER — GADOBUTROL 604.72 MG/ML
0.12 INJECTION INTRAVENOUS ONCE
Status: COMPLETED | OUTPATIENT
Start: 2024-03-08 | End: 2024-03-08

## 2024-03-08 RX ADMIN — GADOBUTROL 13 ML: 604.72 INJECTION INTRAVENOUS at 11:03

## 2024-03-11 ENCOUNTER — CARE COORDINATION (OUTPATIENT)
Dept: CARDIOLOGY | Facility: CLINIC | Age: 44
End: 2024-03-11
Payer: COMMERCIAL

## 2024-03-11 NOTE — PROGRESS NOTES
Spoke with Ricardo. Patient stated he has been feeling good with last decrease of Toprol XL to 12.5 mg once daily. Patient reports BP 110s-120s/70s. Denies arrhythmias or palpitations, no lightheadedness or dizziness.

## 2024-03-15 ENCOUNTER — TELEPHONE (OUTPATIENT)
Dept: CARDIOLOGY | Facility: CLINIC | Age: 44
End: 2024-03-15
Payer: COMMERCIAL

## 2024-03-15 NOTE — TELEPHONE ENCOUNTER
Spoke with patient to discuss recommendation from Dr. José for patient to remain on Toprol 12.5 mg for BP management and cardioprotective benefits. If in the future patient wanted to discontinue use he can stop it and if any palpitations or he sees his BP is elevated at other doctor appointments, would recommend restarting 12.5 mg daily again per Dr. José.     Patient states he will remain on Toprol until his next follow up appointment and will notify clinic if he experiences any symptoms such as lightheadedness or dizziness.     Jayro Menendez RN

## 2024-05-07 ENCOUNTER — MYC MEDICAL ADVICE (OUTPATIENT)
Dept: CARDIOLOGY | Facility: CLINIC | Age: 44
End: 2024-05-07
Payer: COMMERCIAL

## 2024-05-07 ENCOUNTER — VIRTUAL VISIT (OUTPATIENT)
Dept: FAMILY MEDICINE | Facility: CLINIC | Age: 44
End: 2024-05-07
Payer: COMMERCIAL

## 2024-05-07 DIAGNOSIS — F41.9 ANXIETY: Primary | ICD-10-CM

## 2024-05-07 DIAGNOSIS — F43.21 GRIEF: ICD-10-CM

## 2024-05-07 PROCEDURE — 99441 PR PHYSICIAN TELEPHONE EVALUATION 5-10 MIN: CPT | Mod: 93 | Performed by: FAMILY MEDICINE

## 2024-05-07 ASSESSMENT — PATIENT HEALTH QUESTIONNAIRE - PHQ9
SUM OF ALL RESPONSES TO PHQ QUESTIONS 1-9: 0
SUM OF ALL RESPONSES TO PHQ QUESTIONS 1-9: 0
10. IF YOU CHECKED OFF ANY PROBLEMS, HOW DIFFICULT HAVE THESE PROBLEMS MADE IT FOR YOU TO DO YOUR WORK, TAKE CARE OF THINGS AT HOME, OR GET ALONG WITH OTHER PEOPLE: NOT DIFFICULT AT ALL

## 2024-05-07 ASSESSMENT — ANXIETY QUESTIONNAIRES
8. IF YOU CHECKED OFF ANY PROBLEMS, HOW DIFFICULT HAVE THESE MADE IT FOR YOU TO DO YOUR WORK, TAKE CARE OF THINGS AT HOME, OR GET ALONG WITH OTHER PEOPLE?: NOT DIFFICULT AT ALL
7. FEELING AFRAID AS IF SOMETHING AWFUL MIGHT HAPPEN: NOT AT ALL
2. NOT BEING ABLE TO STOP OR CONTROL WORRYING: NOT AT ALL
7. FEELING AFRAID AS IF SOMETHING AWFUL MIGHT HAPPEN: NOT AT ALL
4. TROUBLE RELAXING: NOT AT ALL
6. BECOMING EASILY ANNOYED OR IRRITABLE: NOT AT ALL
GAD7 TOTAL SCORE: 0
3. WORRYING TOO MUCH ABOUT DIFFERENT THINGS: NOT AT ALL
IF YOU CHECKED OFF ANY PROBLEMS ON THIS QUESTIONNAIRE, HOW DIFFICULT HAVE THESE PROBLEMS MADE IT FOR YOU TO DO YOUR WORK, TAKE CARE OF THINGS AT HOME, OR GET ALONG WITH OTHER PEOPLE: NOT DIFFICULT AT ALL
1. FEELING NERVOUS, ANXIOUS, OR ON EDGE: NOT AT ALL
5. BEING SO RESTLESS THAT IT IS HARD TO SIT STILL: NOT AT ALL
GAD7 TOTAL SCORE: 0

## 2024-05-07 NOTE — PROGRESS NOTES
Ricardo is a 44 year old who is being evaluated via a billable telephone visit.    What phone number would you like to be contacted at? [phone]   How would you like to obtain your AVS? Parag  Originating Location (pt. Location): Home    Distant Location (provider location):  On-site    Ricardo was seen today for recheck medication.    Diagnoses and all orders for this visit:    Anxiety    Grief           Subjective   Ricardo is a 44 year old, presenting for the following health issues:  Recheck Medication    HPI       Since November he has had some significant issues with grief, anxiety and depression symptoms.  In late November he was seen at the emergency department and given a prescription for hydroxyzine for acute anxiety relief.  He was subsequently seen on December 14, 2023 and started on sertraline to accompany the hydroxyzine.  I saw him for follow-up on December 29 at which time we increased his sertraline dose.  He is connected with a therapist and continues to see the therapist working through primarily grief issues at this time.  He filled out a PHQ-9 and NELA-7 today prior to our encounter both of which are 0.  He had called expressing consideration that he felt things had much improved and a desire to taper off of the sertraline.  Upon speaking with him it is clear that things are going much better and he has a good handle on managing his symptoms.  He is exercising regularly.  He is work through a complex process of cardiac evaluation due to a heart condition that his mother had that warranted this evaluation.  We talked about all this and agreed together to taper off the medication and discussed a process for doing so.  We elected to take over a 3-week timeframe a gradual stepdown process.  He will begin with the following:    Week 1: Alternate 25 mg with 50 mg every other day.    Week 2: Take 25 mg daily    Week 3: Alternate 25 mg with no dose taken every other day for 1 week then stop.    Complete  review of systems is obtained.  Other than the specific considerations noted above complete review of systems is negative.        Objective           Vitals:  No vitals were obtained today due to virtual visit.    Physical Exam   General: Alert and no distress //Respiratory: No audible wheeze, cough, or shortness of breath // Psychiatric:  Appropriate affect, tone, and pace of words            Phone call duration: 10 minutes  Signed Electronically by: Remy Dai MD, MD

## 2024-05-26 DIAGNOSIS — F43.21 GRIEF: ICD-10-CM

## 2024-05-26 DIAGNOSIS — F41.9 ANXIETY: ICD-10-CM

## 2024-09-30 ENCOUNTER — OFFICE VISIT (OUTPATIENT)
Dept: FAMILY MEDICINE | Facility: CLINIC | Age: 44
End: 2024-09-30
Payer: COMMERCIAL

## 2024-09-30 VITALS
BODY MASS INDEX: 26.42 KG/M2 | DIASTOLIC BLOOD PRESSURE: 66 MMHG | TEMPERATURE: 98.1 F | WEIGHT: 217 LBS | HEART RATE: 82 BPM | OXYGEN SATURATION: 98 % | HEIGHT: 76 IN | SYSTOLIC BLOOD PRESSURE: 124 MMHG | RESPIRATION RATE: 14 BRPM

## 2024-09-30 DIAGNOSIS — Z00.00 ROUTINE GENERAL MEDICAL EXAMINATION AT A HEALTH CARE FACILITY: Primary | ICD-10-CM

## 2024-09-30 DIAGNOSIS — Z83.3 FAMILY HISTORY OF DIABETES MELLITUS: ICD-10-CM

## 2024-09-30 PROCEDURE — 99396 PREV VISIT EST AGE 40-64: CPT | Performed by: FAMILY MEDICINE

## 2024-09-30 SDOH — HEALTH STABILITY: PHYSICAL HEALTH: ON AVERAGE, HOW MANY MINUTES DO YOU ENGAGE IN EXERCISE AT THIS LEVEL?: 40 MIN

## 2024-09-30 SDOH — HEALTH STABILITY: PHYSICAL HEALTH: ON AVERAGE, HOW MANY DAYS PER WEEK DO YOU ENGAGE IN MODERATE TO STRENUOUS EXERCISE (LIKE A BRISK WALK)?: 3 DAYS

## 2024-09-30 ASSESSMENT — SOCIAL DETERMINANTS OF HEALTH (SDOH): HOW OFTEN DO YOU GET TOGETHER WITH FRIENDS OR RELATIVES?: ONCE A WEEK

## 2024-09-30 ASSESSMENT — PAIN SCALES - GENERAL: PAINLEVEL: NO PAIN (0)

## 2024-09-30 NOTE — PROGRESS NOTES
"Ricardo Richardson  /66 (BP Location: Left arm, Patient Position: Sitting, Cuff Size: Adult Large)   Pulse 82   Temp 98.1  F (36.7  C) (Temporal)   Resp 14   Ht 1.94 m (6' 4.38\")   Wt 98.4 kg (217 lb)   SpO2 98%   BMI 26.15 kg/m       Assessment/Plan:                Ricardo was seen today for physical and recheck medication.    Diagnoses and all orders for this visit:    Routine general medical examination at a health care facility    Family history of diabetes mellitus         DISCUSSION  See discussion below.  Subjective:     HPI:    Ricardo Richardson is a 44 year old male is here today for a physical.  He had been dealing with a lot of anxiety especially surrounding his mother's illness and untimely death in this past year.  He is no longer on medication and feels he is coping well.  Continues to see a therapist.  He is exercising more regularly.  His mother  of arrhythmia genic cardiomyopathy.  He had undergone an extensive workup including most recently a cardiac MRI.  On cardiac monitoring he did have NSVT detected.  He was placed on metoprolol.  He was recommended he stay on the medication.  He a lot of questions about metoprolol in general especially in regard to interactions with other medication we reviewed and discussed this thoroughly today.  He does have mild dyslipidemia with low vascular disease risk score.  We will forego checking cholesterol this year based on his stable situation in this regard and the fact he is not fasting today.  He does have a family history of diabetes he had an A1c performed last year that was well within the normal range.  Recommend continued diligence for blood sugar screening even with relative absence of risk factors given the potential for this genetic predisposition.  Will make sure to recheck an A1c again next year.  We talked about appropriate immunizations.  We reviewed colon cancer screening.  We discussed deferring any considerations for prostate cancer " screening.  We discussed other aspects of routine health prevention.  He had a mole removed from his back recently.  It was nonmalignant however there were atypical cells by his report.  The patient reports that a shave biopsy was performed.  There does appear to be pigmented cells within the healing area.  The areas examined today overall relatively low concern based on the pathology however I recommend he does discuss this with his dermatologist.    ROS:  Complete review of systems is obtained.  Other than the specific considerations noted above complete review of systems is negative.      Objective:   Medications:  Current Outpatient Medications   Medication Sig Dispense Refill    metoprolol succinate ER (TOPROL XL) 25 MG 24 hr tablet Take 0.5 tablets (12.5 mg) by mouth daily 45 tablet 3     No current facility-administered medications for this visit.        Allergies:     Allergies   Allergen Reactions    Seasonal Allergies         Social History     Socioeconomic History    Marital status:      Spouse name: Not on file    Number of children: Not on file    Years of education: Not on file    Highest education level: Not on file   Occupational History    Not on file   Tobacco Use    Smoking status: Former     Passive exposure: Past    Smokeless tobacco: Former    Tobacco comments:     off/on, quit 2016; about 5 pack years   Vaping Use    Vaping status: Never Used   Substance and Sexual Activity    Alcohol use: Yes     Comment: Alcoholic Drinks/day: 1-2x/week    Drug use: No    Sexual activity: Yes     Partners: Female   Other Topics Concern    Not on file   Social History Narrative    Not on file     Social Determinants of Health     Financial Resource Strain: Low Risk  (9/30/2024)    Financial Resource Strain     Within the past 12 months, have you or your family members you live with been unable to get utilities (heat, electricity) when it was really needed?: No   Food Insecurity: Low Risk  (9/30/2024)     Food Insecurity     Within the past 12 months, did you worry that your food would run out before you got money to buy more?: No     Within the past 12 months, did the food you bought just not last and you didn t have money to get more?: No   Transportation Needs: Low Risk  (9/30/2024)    Transportation Needs     Within the past 12 months, has lack of transportation kept you from medical appointments, getting your medicines, non-medical meetings or appointments, work, or from getting things that you need?: No   Physical Activity: Insufficiently Active (9/30/2024)    Exercise Vital Sign     Days of Exercise per Week: 3 days     Minutes of Exercise per Session: 40 min   Stress: No Stress Concern Present (9/30/2024)    [place] [employer] - Occupational Stress Questionnaire     Feeling of Stress : Only a little   Social Connections: Unknown (9/30/2024)    Social Connection and Isolation Panel [NHANES]     Frequency of Communication with Friends and Family: Not on file     Frequency of Social Gatherings with Friends and Family: Once a week     Attends Sikh Services: Not on file     Active Member of Clubs or Organizations: Not on file     Attends Club or Organization Meetings: Not on file     Marital Status: Not on file   Interpersonal Safety: Low Risk  (9/30/2024)    Interpersonal Safety     Do you feel physically and emotionally safe where you currently live?: Yes     Within the past 12 months, have you been hit, slapped, kicked or otherwise physically hurt by someone?: No     Within the past 12 months, have you been humiliated or emotionally abused in other ways by your partner or ex-partner?: No   Housing Stability: Low Risk  (9/30/2024)    Housing Stability     Do you have housing? : Yes     Are you worried about losing your housing?: No       Family History   Problem Relation Age of Onset    Diabetes Mother     Diabetes Father     Basal cell carcinoma Father     No Known Problems Sister   "   Dementia Maternal Grandmother     Myelodysplastic syndrome Maternal Grandfather     Pacemaker Maternal Grandfather     Heart Disease Paternal Grandmother     Heart Disease Paternal Grandfather         Most Recent Immunizations   Administered Date(s) Administered    COVID-19 MONOVALENT 12+ (Pfizer) 07/08/2021    Influenza Vaccine >6 months,quad, PF 09/28/2023    TD,PF 7+ (Tenivac) 10/26/2018    TDAP (Adacel,Boostrix) 06/07/2007        Wt Readings from Last 3 Encounters:   09/30/24 98.4 kg (217 lb)   01/19/24 98.3 kg (216 lb 11.2 oz)   12/14/23 96.1 kg (211 lb 14.4 oz)        BP Readings from Last 6 Encounters:   09/30/24 124/66   01/19/24 133/83   12/14/23 (!) 152/73   09/28/23 138/72   09/27/22 130/78   08/26/21 128/78        Hemoglobin A1C   Date Value Ref Range Status   09/28/2023 5.4 0.0 - 5.6 % Final     Comment:     Normal <5.7%   Prediabetes 5.7-6.4%    Diabetes 6.5% or higher     Note: Adopted from ADA consensus guidelines.        The 10-year ASCVD risk score (Stephanie DK, et al., 2019) is: 2.3%    Values used to calculate the score:      Age: 44 years      Sex: Male      Is Non- : No      Diabetic: No      Tobacco smoker: No      Systolic Blood Pressure: 124 mmHg      Is BP treated: No      HDL Cholesterol: 41 mg/dL      Total Cholesterol: 178 mg/dL      PHYSICAL EXAM:    /66 (BP Location: Left arm, Patient Position: Sitting, Cuff Size: Adult Large)   Pulse 82   Temp 98.1  F (36.7  C) (Temporal)   Resp 14   Ht 1.94 m (6' 4.38\")   Wt 98.4 kg (217 lb)   SpO2 98%   BMI 26.15 kg/m           General Appearance:    Alert, cooperative, no distress   Eyes:   No scleral icterus or conjunctival irritation       Ears:    Normal TM's and external ear canals, both ears   Throat:   Lips, mucosa, and tongue normal; teeth and gums normal   Neck:   Supple, symmetrical, trachea midline, no adenopathy;        thyroid:  No enlargement/tenderness/nodules   Lungs:     Clear to auscultation " bilaterally, respirations unlabored, no wheezes or crackles   Heart:    Regular rate and rhythm,  No murmur   Abdomen:    Soft, no distention, no tenderness on palpation, no masses, no organomegaly     Extremities:  No edema, no joint swelling or redness, no evidence of any injuries   Skin: In his mid upper back there is an area about the size of a quarter where there appears to been a shave biopsy that is now nearly completely healed.  Within this there are a few small areas of what appear to be pigment cells.  They appear relatively benign but they are present in this area.   Neurologic:  On gross examination there is no motor or sensory deficit.  Patient walks with a normal gait

## 2025-01-19 ENCOUNTER — ORDERS ONLY (AUTO-RELEASED) (OUTPATIENT)
Dept: CARDIOLOGY | Facility: CLINIC | Age: 45
End: 2025-01-19
Payer: COMMERCIAL

## 2025-01-19 DIAGNOSIS — Z82.49 FAMILY HISTORY OF ARRHYTHMOGENIC LEFT VENTRICULAR CARDIOMYOPATHY: ICD-10-CM

## 2025-02-24 ENCOUNTER — MYC MEDICAL ADVICE (OUTPATIENT)
Dept: CARDIOLOGY | Facility: CLINIC | Age: 45
End: 2025-02-24

## 2025-02-24 LAB — CV ZIO PRELIM RESULTS: NORMAL

## 2025-02-25 NOTE — TELEPHONE ENCOUNTER
Called and spoke with Ricardo. Reviewed with patient that he had 1 episode of nonsustained VT lasting for 5 beats that occurred on Monday, Feb 3rd at noon. Also reviewed that Ricardo had similar finding on past zio patch heart monitor completed on 12/01/223 with 1 episode of VT as well as 1st degree AV block. Informed patient that Dr. José reviewed and states other that other than the one episode of NSVT there were otherwise no rhythm issues. Discussed with patient that these episodes may correlate to triggers such as the ones he mentioned causing patient to feel startled or anxious. Patient is agreeable to schedule annual follow up appt with Dr. José on 3/28 at 2:15 pm to further discuss. All questions answered at this time.      Jayro Menendez RN

## 2025-03-27 NOTE — PROGRESS NOTES
Cardiovascular Genetics Clinic Note    HPI  Dear colleagues,     I had the pleasure of seeing Mr. Ricardo Richardson in the Cardiology clinic.  As you know, Mr. Ricardo Richardson is a pleasant 45 year old male with a past medical history of anxiety, elevated BP without the diagnosis of HTN, and family history of arrhythmogenic cardiomyopathy who presents for further evaluation given his family history.    His mother who had arrhythmogenic cardiomyopathy  from a prolonged hospital course with cardiac complications. She had undergone genetic testing, but did not have any genes known linked to arrhythmogenic cardiomyopathy, so genetic testing has not been recommended for her family members, but cardiac screening was recommended.    Patient reports overall he feels well. He reports anxiety has been overall better in the last year, but he still has had some episodes to caused him increased anxiety. He reports he is more active lately and has lost 10 lbs intentionally, which has helped his stamina. BPs well controlled at home in the last several months. Patient denies presyncope, syncope, chest pain, MATOS, SOB, orthopnea, PND, abdominal pain, nausea, emesis, LE edema, or weight change.     ROS:  A complete 12-point ROS was negative except as above.    PAST MEDICAL HISTORY:  Patient Active Problem List   Diagnosis   (none) - all problems resolved or deleted        FAMILY HISTORY:  Family History   Problem Relation Age of Onset    Diabetes Mother     Diabetes Father     Basal cell carcinoma Father     No Known Problems Sister     Dementia Maternal Grandmother     Myelodysplastic syndrome Maternal Grandfather     Pacemaker Maternal Grandfather     Heart Disease Paternal Grandmother     Heart Disease Paternal Grandfather    Mother with history of arrhythmogenic cardiomyopathy, she passed from complications of this in .    SOCIAL HISTORY:  Patient is  with a young son.   Social History     Tobacco Use    Smoking  status: Former     Passive exposure: Past    Smokeless tobacco: Former    Tobacco comments:     off/on, quit 2016; about 5 pack years   Vaping Use    Vaping status: Never Used   Substance Use Topics    Alcohol use: Yes     Comment: Alcoholic Drinks/day: 1-2x/week    Drug use: No        ALLERGIES:  Allergies   Allergen Reactions    Seasonal Allergies        CURRENT MEDICATIONS:  Current Outpatient Medications   Medication Sig Dispense Refill    metoprolol succinate ER (TOPROL XL) 25 MG 24 hr tablet Take 0.5 tablets (12.5 mg) by mouth daily 45 tablet 3       EXAM:  BP (!) 146/80 (BP Location: Right arm, Patient Position: Chair, Cuff Size: Adult Regular)   Pulse 89   Wt 97.8 kg (215 lb 9.6 oz)   SpO2 100%   BMI 25.98 kg/m      General: appears comfortable, alert and interactive, in no acute distress  Head: normocephalic, atraumatic  Eyes: anicteric sclera, EOMI  Mouth: MMM  Neck: supple, no cervical adenopathy  CV: regular rate and rhythm, no murmur, gallop, rub, estimated JVP ~<6 cm  Resp: clear, no rales or wheezing  GI: soft, nontender, nondistended  Extremities: warm, no peripheral edema, 2+ bilateral radial pulses  Neurological: alert and oriented, no focal deficits  Psych: normal mood and affect  Derm: no rashes on exposed surfaces    Weight  Wt Readings from Last 10 Encounters:   03/28/25 97.8 kg (215 lb 9.6 oz)   09/30/24 98.4 kg (217 lb)   01/19/24 98.3 kg (216 lb 11.2 oz)   12/14/23 96.1 kg (211 lb 14.4 oz)   12/13/23 94.6 kg (208 lb 8 oz)   09/28/23 102.1 kg (225 lb)   09/27/22 104.9 kg (231 lb 4.8 oz)   08/26/21 103 kg (227 lb)   09/28/20 104.3 kg (230 lb)   11/21/18 100.2 kg (221 lb)       I personally reviewed recent labs and data as below and discussed the results with the patient in clinic today.  Labs:  CBC RESULTS:  Lab Results   Component Value Date    WBC 5.9 09/28/2023    RBC 5.10 09/28/2023    HGB 14.7 09/28/2023    HCT 44.3 09/28/2023    MCV 87 09/28/2023    MCH 28.8 09/28/2023    MCHC 33.2  09/28/2023    RDW 13.1 09/28/2023     09/28/2023       CMP RESULTS:  Lab Results   Component Value Date     09/28/2023    POTASSIUM 4.3 09/28/2023    POTASSIUM 4.1 08/26/2021    CHLORIDE 106 09/28/2023    CHLORIDE 108 (H) 08/26/2021    CO2 25 09/28/2023    CO2 24 08/26/2021    ANIONGAP 11 09/28/2023    ANIONGAP 11 08/26/2021    GLC 94 09/28/2023    GLC 94 08/26/2021    BUN 18.7 09/28/2023    BUN 19 08/26/2021    CR 0.86 09/28/2023    GFRESTIMATED >90 09/28/2023    ANUJ 9.4 09/28/2023    BILITOTAL 0.3 09/28/2023    ALBUMIN 4.8 09/28/2023    ALBUMIN 4.2 08/26/2021    ALKPHOS 70 09/28/2023    ALT 22 09/28/2023    AST 25 09/28/2023      Recent Labs   Lab Test 09/28/23  1442 09/27/22  1139   CHOL 178 182   HDL 41 39*   * 121*   TRIG 80 112        Testing/Procedures:  I personally visualized and interpreted:  Cardiac Monitor 12/22/23  One episode of 14-beats of NSVT, asymptomatic and otherwise no remarkable findings    Echocardiogram 1/19/24  Interpretation Summary  Global and regional left ventricular function is normal with an EF of 60-65%.  Global right ventricular function is normal.  No significant valvular abnormalities present.  Estimated mean right atrial pressure is normal.  No pericardial effusion is present.    EKG 1/19/24 shows normal sinus rhythm with no acute ST-T changes    Cardiac Monitor 2/24/25  Patient had a min HR of 44 bpm, max HR of 182 bpm, and avg HR of 63 bpm. Predominant underlying rhythm was Sinus Rhythm. First Degree AV Block was present. 1 run of Ventricular Tachycardia occurred lasting 5 beats with a max rate of 182 bpm (avg 163 bpm). Isolated SVEs were rare (<1.0%), SVE Couplets were rare (<1.0%), and SVE Triplets were rare (<1.0%). Isolated VEs were rare (<1.0%), and no VE Couplets or VE Triplets were present.  No symptoms reported    ECG 3/28/25 shows normal sinus rhythm with no acute ST-T changes    Outside results of note:  Outside records from [facility] were  obtained, and relevant results/notes have been incorporated into HPI.    Assessment and Plan:     In summary, 45 year old male with a past medical history of anxiety, elevated BP without the diagnosis of HTN, and family history of arrhythmogenic cardiomyopathy who presents for further evaluation given his family history.    # Family Hx of Gene-Negative Arrhythmogenic Cardiomyopathy  # Hx of Palpitations with Anxiety  # NSVT on cardiac monitor  # Elevated BP without HTN Diagnosis  Discussed with patient about limitation of stimulants including ETOH, safe recommendations for exercise, and heart healthy diet. Treat any episode of syncope as an emergency and seek immediate medical evaluation.   - Continue metoprolol XL 12.5 mg daily  - Continue treatment of anxiety with PCP  - Will continue surveillance monitoring  - Continue home monitoring, notify if >140/90    Optimal Vascular Metrics    Blood Pressure   BP < 140/90 Yes    On Aspirin  No: Contraindicated due to: Not indicated based on age    On Statin  No: Contraindicated due to: LDL <130 without    Tobacco use  No     To Do:  - No medication changes today  - Follow up with me in 1 year with 2-week cardiac monitor prior, labs and echo    The patient states understanding and is agreeable with plan.   Feel free to contact myself regarding questions or concerns. It was a pleasure to see this patient today.    A total of 49 minutes was spent on the day of the visit, which includes preparation for the visit (reviewing previous medical records, laboratories and investigations), in conjunction with the actual clinic visit with the patient, which includes obtaining a history and physical exam, creating and reviewing the care plan, patient education (and family if present), counseling, documenting clinical information in the electronic health record and care coordination.     The longitudinal plan of care for the diagnosis(es)/condition(s) as documented were addressed during  this visit. Due to the added complexity in care, I will continue to support Ricardo in the subsequent management and with ongoing continuity of care.     Brianna José MD   of Medicine, [facility]  Advanced Heart Failure and Transplant Cardiology

## 2025-03-28 ENCOUNTER — OFFICE VISIT (OUTPATIENT)
Dept: CARDIOLOGY | Facility: CLINIC | Age: 45
End: 2025-03-28
Attending: STUDENT IN AN ORGANIZED HEALTH CARE EDUCATION/TRAINING PROGRAM
Payer: COMMERCIAL

## 2025-03-28 VITALS
BODY MASS INDEX: 25.98 KG/M2 | DIASTOLIC BLOOD PRESSURE: 80 MMHG | OXYGEN SATURATION: 100 % | WEIGHT: 215.6 LBS | HEART RATE: 89 BPM | SYSTOLIC BLOOD PRESSURE: 146 MMHG

## 2025-03-28 DIAGNOSIS — I47.29 NSVT (NONSUSTAINED VENTRICULAR TACHYCARDIA) (H): ICD-10-CM

## 2025-03-28 DIAGNOSIS — R00.2 PALPITATIONS: ICD-10-CM

## 2025-03-28 DIAGNOSIS — Z82.49 FAMILY HISTORY OF ARRHYTHMOGENIC LEFT VENTRICULAR CARDIOMYOPATHY: Primary | ICD-10-CM

## 2025-03-28 DIAGNOSIS — R03.0 ELEVATED BLOOD PRESSURE READING WITHOUT DIAGNOSIS OF HYPERTENSION: ICD-10-CM

## 2025-03-28 PROCEDURE — 3079F DIAST BP 80-89 MM HG: CPT | Performed by: STUDENT IN AN ORGANIZED HEALTH CARE EDUCATION/TRAINING PROGRAM

## 2025-03-28 PROCEDURE — 99215 OFFICE O/P EST HI 40 MIN: CPT | Performed by: STUDENT IN AN ORGANIZED HEALTH CARE EDUCATION/TRAINING PROGRAM

## 2025-03-28 PROCEDURE — 3077F SYST BP >= 140 MM HG: CPT | Performed by: STUDENT IN AN ORGANIZED HEALTH CARE EDUCATION/TRAINING PROGRAM

## 2025-03-28 PROCEDURE — 93005 ELECTROCARDIOGRAM TRACING: CPT

## 2025-03-28 PROCEDURE — 1126F AMNT PAIN NOTED NONE PRSNT: CPT | Performed by: STUDENT IN AN ORGANIZED HEALTH CARE EDUCATION/TRAINING PROGRAM

## 2025-03-28 PROCEDURE — 99213 OFFICE O/P EST LOW 20 MIN: CPT | Performed by: STUDENT IN AN ORGANIZED HEALTH CARE EDUCATION/TRAINING PROGRAM

## 2025-03-28 PROCEDURE — G2211 COMPLEX E/M VISIT ADD ON: HCPCS | Performed by: STUDENT IN AN ORGANIZED HEALTH CARE EDUCATION/TRAINING PROGRAM

## 2025-03-28 ASSESSMENT — PAIN SCALES - GENERAL: PAINLEVEL_OUTOF10: NO PAIN (0)

## 2025-03-28 NOTE — LETTER
3/28/2025      RE: Ricardo Richardson  [address] [place] [address]       Dear Colleague,    Thank you for the opportunity to participate in the care of your patient, Ricardo Richardson, at the [facility]ART [facility] [place] [facility]. Please see a copy of my visit note below.    Cardiovascular Genetics Clinic Note    HPI  Dear colleagues,     I had the pleasure of seeing Mr. Ricardo Richardson in the Cardiology clinic.  As you know, Mr. Ricardo Richardson is a pleasant 45 year old male with a past medical history of anxiety, elevated BP without the diagnosis of HTN, and family history of arrhythmogenic cardiomyopathy who presents for further evaluation given his family history.    His mother who had arrhythmogenic cardiomyopathy  from a prolonged hospital course with cardiac complications. She had undergone genetic testing, but did not have any genes known linked to arrhythmogenic cardiomyopathy, so genetic testing has not been recommended for her family members, but cardiac screening was recommended.    Patient reports overall he feels well. He reports anxiety has been overall better in the last year, but he still has had some episodes to caused him increased anxiety. He reports he is more active lately and has lost 10 lbs intentionally, which has helped his stamina. BPs well controlled at home in the last several months. Patient denies presyncope, syncope, chest pain, MATOS, SOB, orthopnea, PND, abdominal pain, nausea, emesis, LE edema, or weight change.     ROS:  A complete 12-point ROS was negative except as above.    PAST MEDICAL HISTORY:  Patient Active Problem List   Diagnosis   (none) - all problems resolved or deleted        FAMILY HISTORY:  Family History   Problem Relation Age of Onset     Diabetes Mother      Diabetes Father      Basal cell carcinoma Father      No Known Problems Sister      Dementia Maternal Grandmother       Myelodysplastic syndrome Maternal Grandfather      Pacemaker Maternal Grandfather      Heart Disease Paternal Grandmother      Heart Disease Paternal Grandfather    Mother with history of arrhythmogenic cardiomyopathy, she passed from complications of this in 2023.    SOCIAL HISTORY:  Patient is  with a young son.   Social History     Tobacco Use     Smoking status: Former     Passive exposure: Past     Smokeless tobacco: Former     Tobacco comments:     off/on, quit 2016; about 5 pack years   Vaping Use     Vaping status: Never Used   Substance Use Topics     Alcohol use: Yes     Comment: Alcoholic Drinks/day: 1-2x/week     Drug use: No        ALLERGIES:  Allergies   Allergen Reactions     Seasonal Allergies        CURRENT MEDICATIONS:  Current Outpatient Medications   Medication Sig Dispense Refill     metoprolol succinate ER (TOPROL XL) 25 MG 24 hr tablet Take 0.5 tablets (12.5 mg) by mouth daily 45 tablet 3       EXAM:  BP (!) 146/80 (BP Location: Right arm, Patient Position: Chair, Cuff Size: Adult Regular)   Pulse 89   Wt 97.8 kg (215 lb 9.6 oz)   SpO2 100%   BMI 25.98 kg/m      General: appears comfortable, alert and interactive, in no acute distress  Head: normocephalic, atraumatic  Eyes: anicteric sclera, EOMI  Mouth: MMM  Neck: supple, no cervical adenopathy  CV: regular rate and rhythm, no murmur, gallop, rub, estimated JVP ~<6 cm  Resp: clear, no rales or wheezing  GI: soft, nontender, nondistended  Extremities: warm, no peripheral edema, 2+ bilateral radial pulses  Neurological: alert and oriented, no focal deficits  Psych: normal mood and affect  Derm: no rashes on exposed surfaces    Weight  Wt Readings from Last 10 Encounters:   03/28/25 97.8 kg (215 lb 9.6 oz)   09/30/24 98.4 kg (217 lb)   01/19/24 98.3 kg (216 lb 11.2 oz)   12/14/23 96.1 kg (211 lb 14.4 oz)   12/13/23 94.6 kg (208 lb 8 oz)   09/28/23 102.1 kg (225 lb)   09/27/22 104.9 kg (231 lb 4.8 oz)   08/26/21 103 kg (227 lb)    09/28/20 104.3 kg (230 lb)   11/21/18 100.2 kg (221 lb)       I personally reviewed recent labs and data as below and discussed the results with the patient in clinic today.  Labs:  CBC RESULTS:  Lab Results   Component Value Date    WBC 5.9 09/28/2023    RBC 5.10 09/28/2023    HGB 14.7 09/28/2023    HCT 44.3 09/28/2023    MCV 87 09/28/2023    MCH 28.8 09/28/2023    MCHC 33.2 09/28/2023    RDW 13.1 09/28/2023     09/28/2023       CMP RESULTS:  Lab Results   Component Value Date     09/28/2023    POTASSIUM 4.3 09/28/2023    POTASSIUM 4.1 08/26/2021    CHLORIDE 106 09/28/2023    CHLORIDE 108 (H) 08/26/2021    CO2 25 09/28/2023    CO2 24 08/26/2021    ANIONGAP 11 09/28/2023    ANIONGAP 11 08/26/2021    GLC 94 09/28/2023    GLC 94 08/26/2021    BUN 18.7 09/28/2023    BUN 19 08/26/2021    CR 0.86 09/28/2023    GFRESTIMATED >90 09/28/2023    ANUJ 9.4 09/28/2023    BILITOTAL 0.3 09/28/2023    ALBUMIN 4.8 09/28/2023    ALBUMIN 4.2 08/26/2021    ALKPHOS 70 09/28/2023    ALT 22 09/28/2023    AST 25 09/28/2023      Recent Labs   Lab Test 09/28/23  1442 09/27/22  1139   CHOL 178 182   HDL 41 39*   * 121*   TRIG 80 112        Testing/Procedures:  I personally visualized and interpreted:  Cardiac Monitor 12/22/23  One episode of 14-beats of NSVT, asymptomatic and otherwise no remarkable findings    Echocardiogram 1/19/24  Interpretation Summary  Global and regional left ventricular function is normal with an EF of 60-65%.  Global right ventricular function is normal.  No significant valvular abnormalities present.  Estimated mean right atrial pressure is normal.  No pericardial effusion is present.    EKG 1/19/24 shows normal sinus rhythm with no acute ST-T changes    Cardiac Monitor 2/24/25  Patient had a min HR of 44 bpm, max HR of 182 bpm, and avg HR of 63 bpm. Predominant underlying rhythm was Sinus Rhythm. First Degree AV Block was present. 1 run of Ventricular Tachycardia occurred lasting 5 beats with a  max rate of 182 bpm (avg 163 bpm). Isolated SVEs were rare (<1.0%), SVE Couplets were rare (<1.0%), and SVE Triplets were rare (<1.0%). Isolated VEs were rare (<1.0%), and no VE Couplets or VE Triplets were present.  No symptoms reported    ECG 3/28/25 shows normal sinus rhythm with no acute ST-T changes    Outside results of note:  Outside records from [facility] were obtained, and relevant results/notes have been incorporated into HPI.    Assessment and Plan:     In summary, 45 year old male with a past medical history of anxiety, elevated BP without the diagnosis of HTN, and family history of arrhythmogenic cardiomyopathy who presents for further evaluation given his family history.    # Family Hx of Gene-Negative Arrhythmogenic Cardiomyopathy  # Hx of Palpitations with Anxiety  # NSVT on cardiac monitor  # Elevated BP without HTN Diagnosis  Discussed with patient about limitation of stimulants including ETOH, safe recommendations for exercise, and heart healthy diet. Treat any episode of syncope as an emergency and seek immediate medical evaluation.   - Continue metoprolol XL 12.5 mg daily  - Continue treatment of anxiety with PCP  - Will continue surveillance monitoring  - Continue home monitoring, notify if >140/90    Optimal Vascular Metrics    Blood Pressure   BP < 140/90 Yes    On Aspirin  No: Contraindicated due to: Not indicated based on age    On Statin  No: Contraindicated due to: LDL <130 without    Tobacco use  No     To Do:  - No medication changes today  - Follow up with me in 1 year with 2-week cardiac monitor prior, labs and echo    The patient states understanding and is agreeable with plan.   Feel free to contact myself regarding questions or concerns. It was a pleasure to see this patient today.    A total of 49 minutes was spent on the day of the visit, which includes preparation for the visit (reviewing previous medical records, laboratories and investigations), in conjunction with the  actual clinic visit with the patient, which includes obtaining a history and physical exam, creating and reviewing the care plan, patient education (and family if present), counseling, documenting clinical information in the electronic health record and care coordination.     The longitudinal plan of care for the diagnosis(es)/condition(s) as documented were addressed during this visit. Due to the added complexity in care, I will continue to support Ricardo in the subsequent management and with ongoing continuity of care.     Brianna José MD   of Medicine, [facility]  Advanced Heart Failure and Transplant Cardiology         Please do not hesitate to contact me if you have any questions/concerns.     Sincerely,     Brianna José MD

## 2025-03-28 NOTE — NURSING NOTE
Chief Complaint   Patient presents with    Follow Up     3/28/25: 44 year old male with family history of ALVC presenting for yearly follow up.       Vitals were taken, medications reconciled and EKG performed.    LESIA Chilel    2:13 PM

## 2025-03-28 NOTE — PATIENT INSTRUCTIONS
"  Thank you for visiting the Adult Congenital and Cardiovascular Genetics Clinic at the [facility].    Cardiology Providers you saw during your visit:  Brianna José MD    Diagnosis:  ALVC    Results:  Brianna José MD reviewed the results of your EKG and zio patch montior testing today in clinic.    Recommendations for you:    No changes      General Cardiac Recommendations:  Continue to eat a heart healthy, low salt diet.  Continue to get 20-30 minutes of aerobic activity, 4-5 days per week.  Examples of aerobic activity include walking, running, swimming, cycling, etc.  Continue to observe good oral hygiene, with regular dental visits.      SBE prophylaxis:   Yes____  No___x_    Exercise restrictions:   Yes__X__  No____         If yes, list restrictions:  Must be allowed to rest if fatigued or SOB      FASTING CHOLESTEROL was checked in the last 5 years YES_x___  NO____ (2023)  If no, please follow up with your primary care physician. You should have a cholesterol screening every 5 years.      Follow-up:  Follow up with Dr. José in 1 year with labs, echocardiogram, EKG and 2 week ziopatch prior    If you have questions or concerns please contact us at:    Jayro Menendez RN, BSN     Yuni Natarajan (Scheduling)  Nurse Care Coordinator     Clinic   CV Genetics      Adult Congenital and CV Genetic  [facility]   [facility]  (P) [phone]     (P) [phone]  (F) [phone]     (F) [phone]        For after hours urgent needs, call [phone] and ask to speak to the \"On-Call Cardiologist.\"      For emergencies call [phone].    [facility]  [facility]  Mail Code [id number]  [address] [place]  [address]   "

## 2025-03-29 DIAGNOSIS — Z82.49 FAMILY HISTORY OF ARRHYTHMOGENIC LEFT VENTRICULAR CARDIOMYOPATHY: ICD-10-CM

## 2025-03-31 LAB
ATRIAL RATE - MUSE: 83 BPM
DIASTOLIC BLOOD PRESSURE - MUSE: NORMAL MMHG
INTERPRETATION ECG - MUSE: NORMAL
P AXIS - MUSE: 67 DEGREES
PR INTERVAL - MUSE: 172 MS
QRS DURATION - MUSE: 90 MS
QT - MUSE: 374 MS
QTC - MUSE: 439 MS
R AXIS - MUSE: 62 DEGREES
SYSTOLIC BLOOD PRESSURE - MUSE: NORMAL MMHG
T AXIS - MUSE: 62 DEGREES
VENTRICULAR RATE- MUSE: 83 BPM

## 2025-04-02 ENCOUNTER — MYC REFILL (OUTPATIENT)
Dept: CARDIOLOGY | Facility: CLINIC | Age: 45
End: 2025-04-02
Payer: COMMERCIAL

## 2025-04-02 DIAGNOSIS — Z82.49 FAMILY HISTORY OF ARRHYTHMOGENIC LEFT VENTRICULAR CARDIOMYOPATHY: ICD-10-CM

## 2025-04-03 RX ORDER — METOPROLOL SUCCINATE 25 MG/1
12.5 TABLET, EXTENDED RELEASE ORAL DAILY
Qty: 45 TABLET | Refills: 0 | Status: SHIPPED | OUTPATIENT
Start: 2025-04-03

## 2025-04-03 NOTE — TELEPHONE ENCOUNTER
Last Written Prescription:  metoprolol succinate ER (TOPROL XL) 25 MG 24 hr tablet 45 tablet 3 1/31/2024 -- No   Sig - Route: Take 0.5 tablets (12.5 mg) by mouth daily - Oral     ----------------------  Last Visit Date: 3-28-25  Future Visit Date: none  ----------------------      Refill decision: FYI: BP above protocol( noted in clinic note), RF 90 day    Request from pharmacy:  Requested Prescriptions   Pending Prescriptions Disp Refills    metoprolol succinate ER (TOPROL XL) 25 MG 24 hr tablet 45 tablet 3     Sig: Take 0.5 tablets (12.5 mg) by mouth daily.       Beta-Blockers Protocol Failed - 4/3/2025  9:52 AM        Failed - Most recent blood pressure under 140/90 in past 12 months     BP Readings from Last 3 Encounters:   03/28/25 (!) 146/80   09/30/24 124/66   01/19/24 133/83       No data recorded            Failed - Medication indicated for associated diagnosis     Medication is associated with one or more of the following diagnoses:     Hypertension (HTN)   Atrial fibrillation/flutter   Angina   ASCVD   Migraine   Heart Failure   Tremor   Anxiety   Ocular hypertension   Glaucoma   PTSD   Obstructive hypertrophic cardiomyopathy   History of myocarditis   Palpitations   POTS (postural orthostatic tachycardia syndrome)   SVT (supraventricular tachycardia)   Hyperthyroidism   Tachycardia   Acute non-st segment elevation myocardial infarction   Subsequent non-st segment elevation myocardial infarction   Ischemic myocardial dysfunction          Passed - Patient is age 6 or older        Passed - Medication is active on med list and the sig matches. RN to manually verify dose and sig if red X/fail.     If the protocol passes (green check), you do not need to verify med dose and sig.    A prescription matches if they are the same clinical intention.    For Example: once daily and every morning are the same.    The protocol can not identify upper and lower case letters as matching and will fail.     For Example:  Take 1 tablet (50 mg) by mouth daily     TAKE 1 TABLET (50 MG) BY MOUTH DAILY    For all fails (red x), verify dose and sig.    If the refill does match what is on file, the RN can still proceed to approve the refill request.       If they do not match, route to the appropriate provider.             Passed - Recent (12 mo) or future (90 days) visit within the authorizing provider's specialty     The patient must have completed an in-person or virtual visit within the past 12 months or has a future visit scheduled within the next 90 days with the authorizing provider s specialty.  Urgent care and e-visits do not qualify as an office visit for this protocol.

## 2025-04-07 RX ORDER — METOPROLOL SUCCINATE 25 MG/1
12.5 TABLET, EXTENDED RELEASE ORAL DAILY
Qty: 45 TABLET | Refills: 3 | OUTPATIENT
Start: 2025-04-07

## 2025-04-07 NOTE — TELEPHONE ENCOUNTER
metoprolol succinate ER (TOPROL XL) 25 MG 24 hr tablet 45 tablet 0 4/3/2025     Sig - Route: Take 0.5 tablets (12.5 mg) by mouth daily. - Oral   Sent to pharmacy as: Metoprolol Succinate ER 25 MG Oral Tablet Extended Release 24 Hour (TOPROL XL)   Class: E-Prescribe   Order: [id number]   E-Prescribing Status: Receipt confirmed by pharmacy (4/3/2025  9:55 AM CDT)     [facility] [id number] [place] [address]     Should have refills on file. Patient  sent message. Rx refill denied    Pauline Osorio RN  P Red Flag Triage/MRT

## 2025-04-14 ENCOUNTER — OFFICE VISIT (OUTPATIENT)
Dept: FAMILY MEDICINE | Facility: CLINIC | Age: 45
End: 2025-04-14
Payer: COMMERCIAL

## 2025-04-14 VITALS
SYSTOLIC BLOOD PRESSURE: 138 MMHG | TEMPERATURE: 98 F | WEIGHT: 204 LBS | HEART RATE: 76 BPM | RESPIRATION RATE: 20 BRPM | HEIGHT: 76 IN | DIASTOLIC BLOOD PRESSURE: 72 MMHG | BODY MASS INDEX: 24.84 KG/M2 | OXYGEN SATURATION: 100 %

## 2025-04-14 DIAGNOSIS — H93.8X1 AUDIBLE HEARTBEAT IN EAR, RIGHT: Primary | ICD-10-CM

## 2025-04-14 PROCEDURE — 3075F SYST BP GE 130 - 139MM HG: CPT | Performed by: FAMILY MEDICINE

## 2025-04-14 PROCEDURE — 3078F DIAST BP <80 MM HG: CPT | Performed by: FAMILY MEDICINE

## 2025-04-14 PROCEDURE — 99214 OFFICE O/P EST MOD 30 MIN: CPT | Performed by: FAMILY MEDICINE

## 2025-04-14 PROCEDURE — 1126F AMNT PAIN NOTED NONE PRSNT: CPT | Performed by: FAMILY MEDICINE

## 2025-04-14 ASSESSMENT — PAIN SCALES - GENERAL: PAINLEVEL_OUTOF10: NO PAIN (0)

## 2025-04-14 NOTE — PROGRESS NOTES
"Ricardo Richardson  /72   Pulse 76   Temp 98  F (36.7  C)   Resp 20   Ht 1.93 m (6' 4\")   Wt 92.5 kg (204 lb)   SpO2 100%   BMI 24.83 kg/m       Assessment/Plan:                Ricardo was seen today for ear problem and anxiety.    Diagnoses and all orders for this visit:    Audible heartbeat in ear, right  -     US Carotid Bilateral; Future         DISCUSSION  I do not think he has pulsatile tinnitus as it is not present unless the area on the outer portion of the ear is compressed.  I do believe it is highly likely that he is hearing a bruit in response to compression of the temporal artery near the ear.  We will send him for an ultrasound to rule out any atherosclerosis in the nearby vasculature, namely the carotid artery.  He does monitor blood pressure outside of the clinic and reports favorable numbers he is encouraged to continue this process.  We discussed considerations to monitor for that would necessitate further consideration such as a more persistent type of audible pulsation.  Based on the carotid ultrasound results and his ongoing monitoring of blood pressure we will decide if any additional action is necessary.  Subjective:     HPI:    Ricardo Richardson is a 45 year old male is here today concerned regarding an audible pulse primarily in the right ear when he lies down to sleep at night.    He had sent me a message regarding this concern and I suggested he come in so we can discuss further and evaluate.  He was very concerned he might have pulsatile tinnitus.    What he describes is not a constant sound but a sound that occurs where he hears his heartbeat in his right ear typically if he lies on that side.  Placing more pressure makes it more audible to a point and then if he puts a lot of pressure on the area it seems to actually go away.  He does not report any significant changes in the inner ears such as any pain discomfort or unusual pressure.  From time to time he will have symptoms of " eustachian tube dysfunction.  He does not report vertigo.  He does not report hearing loss or changes.  He does not have any distinct ringing sound.  The sound although present at night and he is seemingly more fixated on it at this time does not typically keep him up.    He does take metoprolol at the direction of cardiology.  He had undergone an extensive cardiac evaluation after his mother had  of an arrhythmia caused by a genetic cardiomyopathy.  He does not have any signs of cardiomyopathy on his cardiac workup.  He does have mild dyslipidemia with a low calculated vascular disease risk.  He does struggle with some anxiety, but is sees a therapist and reports he seems to be managing well.    ROS:  Complete review of systems is obtained.  Other than the specific considerations noted above complete review of systems is negative.      Objective:   Medications:  Current Outpatient Medications   Medication Sig Dispense Refill    metoprolol succinate ER (TOPROL XL) 25 MG 24 hr tablet Take 0.5 tablets (12.5 mg) by mouth daily. 45 tablet 0     No current facility-administered medications for this visit.        Allergies:     Allergies   Allergen Reactions    Seasonal Allergies         Social History     Socioeconomic History    Marital status:      Spouse name: Not on file    Number of children: Not on file    Years of education: Not on file    Highest education level: Not on file   Occupational History    Not on file   Tobacco Use    Smoking status: Former     Passive exposure: Past    Smokeless tobacco: Former    Tobacco comments:     off/on, quit ; about 5 pack years   Vaping Use    Vaping status: Never Used   Substance and Sexual Activity    Alcohol use: Yes     Comment: Alcoholic Drinks/day: 1-2x/week    Drug use: No    Sexual activity: Yes     Partners: Female   Other Topics Concern    Not on file   Social History Narrative    Not on file     Social Drivers of Health     Financial Resource Strain:  Low Risk  (9/30/2024)    Financial Resource Strain     Within the past 12 months, have you or your family members you live with been unable to get utilities (heat, electricity) when it was really needed?: No   Food Insecurity: Low Risk  (9/30/2024)    Food Insecurity     Within the past 12 months, did you worry that your food would run out before you got money to buy more?: No     Within the past 12 months, did the food you bought just not last and you didn t have money to get more?: No   Transportation Needs: Low Risk  (9/30/2024)    Transportation Needs     Within the past 12 months, has lack of transportation kept you from medical appointments, getting your medicines, non-medical meetings or appointments, work, or from getting things that you need?: No   Physical Activity: Insufficiently Active (9/30/2024)    Exercise Vital Sign     Days of Exercise per Week: 3 days     Minutes of Exercise per Session: 40 min   Stress: No Stress Concern Present (9/30/2024)    [employer] [place] [employer] - Occupational Stress Questionnaire     Feeling of Stress : Only a little   Social Connections: Unknown (9/30/2024)    Social Connection and Isolation Panel [NHANES]     Frequency of Communication with Friends and Family: Not on file     Frequency of Social Gatherings with Friends and Family: Once a week     Attends Tenriism Services: Not on file     Active Member of Clubs or Organizations: Not on file     Attends Club or Organization Meetings: Not on file     Marital Status: Not on file   Interpersonal Safety: Low Risk  (4/14/2025)    Interpersonal Safety     Do you feel physically and emotionally safe where you currently live?: Yes     Within the past 12 months, have you been hit, slapped, kicked or otherwise physically hurt by someone?: No     Within the past 12 months, have you been humiliated or emotionally abused in other ways by your partner or ex-partner?: No   Housing Stability: Low Risk  (9/30/2024)     "Housing Stability     Do you have housing? : Yes     Are you worried about losing your housing?: No       Family History   Problem Relation Age of Onset    Diabetes Mother     Diabetes Father     Basal cell carcinoma Father     No Known Problems Sister     Dementia Maternal Grandmother     Myelodysplastic syndrome Maternal Grandfather     Pacemaker Maternal Grandfather     Heart Disease Paternal Grandmother     Heart Disease Paternal Grandfather         Most Recent Immunizations   Administered Date(s) Administered    COVID-19 MONOVALENT 12+ (Pfizer) 07/08/2021    Influenza Vaccine >6 months,quad, PF 09/28/2023    TD,PF 7+ (Tenivac) 10/26/2018    TDAP (Adacel,Boostrix) 06/07/2007        Wt Readings from Last 3 Encounters:   04/14/25 92.5 kg (204 lb)   03/28/25 97.8 kg (215 lb 9.6 oz)   09/30/24 98.4 kg (217 lb)        BP Readings from Last 6 Encounters:   04/14/25 138/72   03/28/25 (!) 146/80   09/30/24 124/66   01/19/24 133/83   12/14/23 (!) 152/73   09/28/23 138/72        Hemoglobin A1C   Date Value Ref Range Status   09/28/2023 5.4 0.0 - 5.6 % Final     Comment:     Normal <5.7%   Prediabetes 5.7-6.4%    Diabetes 6.5% or higher     Note: Adopted from ADA consensus guidelines.              PHYSICAL EXAM:    /72   Pulse 76   Temp 98  F (36.7  C)   Resp 20   Ht 1.93 m (6' 4\")   Wt 92.5 kg (204 lb)   SpO2 100%   BMI 24.83 kg/m       General: Patient noted no signs of distress    HEENT: No abnormality seen within the ear.  Normal palpable pulsation of both tympanic artery pulsations just anterior to the ear itself on both sides.  No carotid bruits are heard on auscultation of either carotid artery.  The neck is supple without lymphadenopathy masses or tenderness.      Answers submitted by the patient for this visit:  General Questionnaire (Submitted on 4/11/2025)  Chief Complaint: Chronic problems general questions HPI Form  How many days per week do you miss taking your medication?: 0  General Concern " (Submitted on 4/11/2025)  Chief Complaint: Chronic problems general questions HPI Form  What is the reason for your visit today?: Pulsitial tinitus  When did your symptoms begin?: More than a month  What are your symptoms?: Hearing Pulse on pillow when laying head down  How would you describe these symptoms?: Mild  Are your symptoms:: Improving  Have you had these symptoms before?: No  Questionnaire about: Chronic problems general questions HPI Form (Submitted on 4/11/2025)  Chief Complaint: Chronic problems general questions HPI Form

## 2025-04-21 ENCOUNTER — HOSPITAL ENCOUNTER (OUTPATIENT)
Dept: ULTRASOUND IMAGING | Facility: CLINIC | Age: 45
Discharge: HOME | End: 2025-04-21
Attending: FAMILY MEDICINE | Admitting: FAMILY MEDICINE
Payer: COMMERCIAL

## 2025-04-21 DIAGNOSIS — H93.8X1 AUDIBLE HEARTBEAT IN EAR, RIGHT: ICD-10-CM

## 2025-04-21 PROCEDURE — 93880 EXTRACRANIAL BILAT STUDY: CPT

## 2025-07-03 ENCOUNTER — MYC REFILL (OUTPATIENT)
Dept: CARDIOLOGY | Facility: CLINIC | Age: 45
End: 2025-07-03
Payer: COMMERCIAL

## 2025-07-03 DIAGNOSIS — Z82.49 FAMILY HISTORY OF ARRHYTHMOGENIC LEFT VENTRICULAR CARDIOMYOPATHY: ICD-10-CM

## 2025-07-07 ENCOUNTER — MYC MEDICAL ADVICE (OUTPATIENT)
Dept: CARDIOLOGY | Facility: CLINIC | Age: 45
End: 2025-07-07
Payer: COMMERCIAL

## 2025-07-07 DIAGNOSIS — Z82.49 FAMILY HISTORY OF ARRHYTHMOGENIC LEFT VENTRICULAR CARDIOMYOPATHY: ICD-10-CM

## 2025-07-07 RX ORDER — METOPROLOL SUCCINATE 25 MG/1
12.5 TABLET, EXTENDED RELEASE ORAL DAILY
Qty: 45 TABLET | Refills: 3 | Status: SHIPPED | OUTPATIENT
Start: 2025-07-07

## 2025-09-01 ENCOUNTER — PATIENT OUTREACH (OUTPATIENT)
Dept: CARE COORDINATION | Facility: CLINIC | Age: 45
End: 2025-09-01
Payer: COMMERCIAL